# Patient Record
Sex: MALE | Race: WHITE | NOT HISPANIC OR LATINO
[De-identification: names, ages, dates, MRNs, and addresses within clinical notes are randomized per-mention and may not be internally consistent; named-entity substitution may affect disease eponyms.]

---

## 2017-07-26 PROBLEM — Z00.00 ENCOUNTER FOR PREVENTIVE HEALTH EXAMINATION: Status: ACTIVE | Noted: 2017-07-26

## 2017-08-02 ENCOUNTER — APPOINTMENT (OUTPATIENT)
Dept: CARDIOLOGY | Facility: CLINIC | Age: 32
End: 2017-08-02

## 2018-11-28 ENCOUNTER — INPATIENT (INPATIENT)
Facility: HOSPITAL | Age: 33
LOS: 6 days | Discharge: HOME | DRG: 418 | End: 2018-12-05
Attending: HOSPITALIST | Admitting: STUDENT IN AN ORGANIZED HEALTH CARE EDUCATION/TRAINING PROGRAM
Payer: COMMERCIAL

## 2018-11-28 VITALS
HEART RATE: 116 BPM | OXYGEN SATURATION: 99 % | TEMPERATURE: 99 F | SYSTOLIC BLOOD PRESSURE: 184 MMHG | DIASTOLIC BLOOD PRESSURE: 113 MMHG | RESPIRATION RATE: 22 BRPM

## 2018-11-28 LAB
ALBUMIN SERPL ELPH-MCNC: 4.5 G/DL — SIGNIFICANT CHANGE UP (ref 3.3–5.2)
ALP SERPL-CCNC: 56 U/L — SIGNIFICANT CHANGE UP (ref 40–120)
ALT FLD-CCNC: 104 U/L — HIGH
ANION GAP SERPL CALC-SCNC: 15 MMOL/L — SIGNIFICANT CHANGE UP (ref 5–17)
APTT BLD: 30 SEC — SIGNIFICANT CHANGE UP (ref 27.5–36.3)
AST SERPL-CCNC: 127 U/L — HIGH
BASOPHILS # BLD AUTO: 0 K/UL — SIGNIFICANT CHANGE UP (ref 0–0.2)
BASOPHILS NFR BLD AUTO: 0.1 % — SIGNIFICANT CHANGE UP (ref 0–2)
BILIRUB SERPL-MCNC: 0.8 MG/DL — SIGNIFICANT CHANGE UP (ref 0.4–2)
BUN SERPL-MCNC: 11 MG/DL — SIGNIFICANT CHANGE UP (ref 8–20)
CALCIUM SERPL-MCNC: 9.6 MG/DL — SIGNIFICANT CHANGE UP (ref 8.6–10.2)
CHLORIDE SERPL-SCNC: 99 MMOL/L — SIGNIFICANT CHANGE UP (ref 98–107)
CO2 SERPL-SCNC: 24 MMOL/L — SIGNIFICANT CHANGE UP (ref 22–29)
CREAT SERPL-MCNC: 0.65 MG/DL — SIGNIFICANT CHANGE UP (ref 0.5–1.3)
EOSINOPHIL # BLD AUTO: 0.2 K/UL — SIGNIFICANT CHANGE UP (ref 0–0.5)
EOSINOPHIL NFR BLD AUTO: 2 % — SIGNIFICANT CHANGE UP (ref 0–5)
GLUCOSE SERPL-MCNC: 125 MG/DL — HIGH (ref 70–115)
HCT VFR BLD CALC: 43 % — SIGNIFICANT CHANGE UP (ref 42–52)
HGB BLD-MCNC: 14.4 G/DL — SIGNIFICANT CHANGE UP (ref 14–18)
INR BLD: 1.06 RATIO — SIGNIFICANT CHANGE UP (ref 0.88–1.16)
LIDOCAIN IGE QN: 26 U/L — SIGNIFICANT CHANGE UP (ref 22–51)
LYMPHOCYTES # BLD AUTO: 3.6 K/UL — SIGNIFICANT CHANGE UP (ref 1–4.8)
LYMPHOCYTES # BLD AUTO: 48.8 % — SIGNIFICANT CHANGE UP (ref 20–55)
MCHC RBC-ENTMCNC: 27.7 PG — SIGNIFICANT CHANGE UP (ref 27–31)
MCHC RBC-ENTMCNC: 33.5 G/DL — SIGNIFICANT CHANGE UP (ref 32–36)
MCV RBC AUTO: 82.7 FL — SIGNIFICANT CHANGE UP (ref 80–94)
MONOCYTES # BLD AUTO: 0.6 K/UL — SIGNIFICANT CHANGE UP (ref 0–0.8)
MONOCYTES NFR BLD AUTO: 7.6 % — SIGNIFICANT CHANGE UP (ref 3–10)
NEUTROPHILS # BLD AUTO: 3 K/UL — SIGNIFICANT CHANGE UP (ref 1.8–8)
NEUTROPHILS NFR BLD AUTO: 41.4 % — SIGNIFICANT CHANGE UP (ref 37–73)
PLATELET # BLD AUTO: 281 K/UL — SIGNIFICANT CHANGE UP (ref 150–400)
POTASSIUM SERPL-MCNC: 4.1 MMOL/L — SIGNIFICANT CHANGE UP (ref 3.5–5.3)
POTASSIUM SERPL-SCNC: 4.1 MMOL/L — SIGNIFICANT CHANGE UP (ref 3.5–5.3)
PROT SERPL-MCNC: 7.8 G/DL — SIGNIFICANT CHANGE UP (ref 6.6–8.7)
PROTHROM AB SERPL-ACNC: 12.2 SEC — SIGNIFICANT CHANGE UP (ref 10–12.9)
RBC # BLD: 5.2 M/UL — SIGNIFICANT CHANGE UP (ref 4.6–6.2)
RBC # FLD: 13.3 % — SIGNIFICANT CHANGE UP (ref 11–15.6)
SODIUM SERPL-SCNC: 138 MMOL/L — SIGNIFICANT CHANGE UP (ref 135–145)
TROPONIN T SERPL-MCNC: <0.01 NG/ML — SIGNIFICANT CHANGE UP (ref 0–0.06)
WBC # BLD: 7.3 K/UL — SIGNIFICANT CHANGE UP (ref 4.8–10.8)
WBC # FLD AUTO: 7.3 K/UL — SIGNIFICANT CHANGE UP (ref 4.8–10.8)

## 2018-11-28 PROCEDURE — 71046 X-RAY EXAM CHEST 2 VIEWS: CPT | Mod: 26

## 2018-11-28 PROCEDURE — 99285 EMERGENCY DEPT VISIT HI MDM: CPT

## 2018-11-28 RX ORDER — FAMOTIDINE 10 MG/ML
20 INJECTION INTRAVENOUS ONCE
Qty: 0 | Refills: 0 | Status: COMPLETED | OUTPATIENT
Start: 2018-11-28 | End: 2018-11-28

## 2018-11-28 RX ORDER — HYDROMORPHONE HYDROCHLORIDE 2 MG/ML
1 INJECTION INTRAMUSCULAR; INTRAVENOUS; SUBCUTANEOUS ONCE
Qty: 0 | Refills: 0 | Status: DISCONTINUED | OUTPATIENT
Start: 2018-11-28 | End: 2018-11-28

## 2018-11-28 RX ORDER — SODIUM CHLORIDE 9 MG/ML
2000 INJECTION INTRAMUSCULAR; INTRAVENOUS; SUBCUTANEOUS ONCE
Qty: 0 | Refills: 0 | Status: COMPLETED | OUTPATIENT
Start: 2018-11-28 | End: 2018-11-28

## 2018-11-28 RX ORDER — ONDANSETRON 8 MG/1
4 TABLET, FILM COATED ORAL ONCE
Qty: 0 | Refills: 0 | Status: COMPLETED | OUTPATIENT
Start: 2018-11-28 | End: 2018-11-28

## 2018-11-28 RX ADMIN — ONDANSETRON 4 MILLIGRAM(S): 8 TABLET, FILM COATED ORAL at 19:08

## 2018-11-28 RX ADMIN — FAMOTIDINE 20 MILLIGRAM(S): 10 INJECTION INTRAVENOUS at 19:08

## 2018-11-28 RX ADMIN — SODIUM CHLORIDE 2000 MILLILITER(S): 9 INJECTION INTRAMUSCULAR; INTRAVENOUS; SUBCUTANEOUS at 19:47

## 2018-11-28 RX ADMIN — HYDROMORPHONE HYDROCHLORIDE 1 MILLIGRAM(S): 2 INJECTION INTRAMUSCULAR; INTRAVENOUS; SUBCUTANEOUS at 19:04

## 2018-11-28 RX ADMIN — HYDROMORPHONE HYDROCHLORIDE 1 MILLIGRAM(S): 2 INJECTION INTRAMUSCULAR; INTRAVENOUS; SUBCUTANEOUS at 19:09

## 2018-11-28 NOTE — ED ADULT TRIAGE NOTE - CHIEF COMPLAINT QUOTE
Pt with epigastric pain after eating [employer]. Pt is in aflutter on the monitor. Has h/o aflutter and non-compliant with medication.

## 2018-11-28 NOTE — ED ADULT NURSE NOTE - OBJECTIVE STATEMENT
pt biba at 1900. pt c/o abd pain and chest pain that started after eating t[employer]. pt is tearful. pt abd is tender to medial abd. positive bowel sounds in all four quadrants. pt is afib on cardiac monitor.

## 2018-11-28 NOTE — ED ADULT NURSE REASSESSMENT NOTE - NS ED NURSE REASSESS COMMENT FT1
Assumed pt care at 2130.  Pt awake, alert and oriented x3.  Rapid aflutter on cardiac monitor.  Pt denies chest pain at this time.  C/o 5/10 epigastric pain.  Abdomen soft, nontender, nondistended.
Pt returned from US without incident.   vital signs stable at this time.  Pt states he is feeling better.  comfortable appearance.  will continue to monitor.
assumed care of pt 2300, charting as noted. pt alert and oriented x4 in no current distress. MD called due to pt still having pain in left shoulder. when pt is resting HR 70, when patient talks HR increases to 130, MD aware.
Pt care assumed.  Pt reports improvement in conditions but continues to feel mild epigastric pain w radiation to back.  Will continue to monitor.

## 2018-11-28 NOTE — ED ADULT NURSE NOTE - NSIMPLEMENTINTERV_GEN_ALL_ED
Implemented All Universal Safety Interventions:  Williams to call system. Call bell, personal items and telephone within reach. Instruct patient to call for assistance. Room bathroom lighting operational. Non-slip footwear when patient is off stretcher. Physically safe environment: no spills, clutter or unnecessary equipment. Stretcher in lowest position, wheels locked, appropriate side rails in place.

## 2018-11-29 ENCOUNTER — TRANSCRIPTION ENCOUNTER (OUTPATIENT)
Age: 33
End: 2018-11-29

## 2018-11-29 DIAGNOSIS — I48.92 UNSPECIFIED ATRIAL FLUTTER: ICD-10-CM

## 2018-11-29 LAB
ALBUMIN SERPL ELPH-MCNC: 4 G/DL — SIGNIFICANT CHANGE UP (ref 3.3–5.2)
ALP SERPL-CCNC: 76 U/L — SIGNIFICANT CHANGE UP (ref 40–120)
ALT FLD-CCNC: 543 U/L — HIGH
AMPHET UR-MCNC: NEGATIVE — SIGNIFICANT CHANGE UP
ANION GAP SERPL CALC-SCNC: 14 MMOL/L — SIGNIFICANT CHANGE UP (ref 5–17)
APPEARANCE UR: CLEAR — SIGNIFICANT CHANGE UP
AST SERPL-CCNC: 597 U/L — HIGH
BACTERIA # UR AUTO: ABNORMAL
BARBITURATES UR SCN-MCNC: NEGATIVE — SIGNIFICANT CHANGE UP
BASOPHILS # BLD AUTO: 0 K/UL — SIGNIFICANT CHANGE UP (ref 0–0.2)
BASOPHILS NFR BLD AUTO: 0.3 % — SIGNIFICANT CHANGE UP (ref 0–2)
BENZODIAZ UR-MCNC: NEGATIVE — SIGNIFICANT CHANGE UP
BILIRUB DIRECT SERPL-MCNC: 2 MG/DL — HIGH (ref 0–0.3)
BILIRUB SERPL-MCNC: 4 MG/DL — HIGH (ref 0.4–2)
BILIRUB UR-MCNC: NEGATIVE — SIGNIFICANT CHANGE UP
BUN SERPL-MCNC: 10 MG/DL — SIGNIFICANT CHANGE UP (ref 8–20)
CALCIUM SERPL-MCNC: 9 MG/DL — SIGNIFICANT CHANGE UP (ref 8.6–10.2)
CHLORIDE SERPL-SCNC: 101 MMOL/L — SIGNIFICANT CHANGE UP (ref 98–107)
CHOLEST SERPL-MCNC: 109 MG/DL — LOW (ref 110–199)
CO2 SERPL-SCNC: 25 MMOL/L — SIGNIFICANT CHANGE UP (ref 22–29)
COCAINE METAB.OTHER UR-MCNC: NEGATIVE — SIGNIFICANT CHANGE UP
COLOR SPEC: YELLOW — SIGNIFICANT CHANGE UP
CREAT SERPL-MCNC: 0.68 MG/DL — SIGNIFICANT CHANGE UP (ref 0.5–1.3)
DIFF PNL FLD: ABNORMAL
EOSINOPHIL # BLD AUTO: 0 K/UL — SIGNIFICANT CHANGE UP (ref 0–0.5)
EOSINOPHIL NFR BLD AUTO: 1.3 % — SIGNIFICANT CHANGE UP (ref 0–5)
EPI CELLS # UR: SIGNIFICANT CHANGE UP
GLUCOSE SERPL-MCNC: 106 MG/DL — SIGNIFICANT CHANGE UP (ref 70–115)
GLUCOSE UR QL: NEGATIVE MG/DL — SIGNIFICANT CHANGE UP
HBA1C BLD-MCNC: 5 % — SIGNIFICANT CHANGE UP (ref 4–5.6)
HCT VFR BLD CALC: 43.1 % — SIGNIFICANT CHANGE UP (ref 42–52)
HDLC SERPL-MCNC: 37 MG/DL — LOW
HGB BLD-MCNC: 14.1 G/DL — SIGNIFICANT CHANGE UP (ref 14–18)
KETONES UR-MCNC: ABNORMAL
LEUKOCYTE ESTERASE UR-ACNC: ABNORMAL
LIPID PNL WITH DIRECT LDL SERPL: 57 MG/DL — SIGNIFICANT CHANGE UP
LYMPHOCYTES # BLD AUTO: 1.2 K/UL — SIGNIFICANT CHANGE UP (ref 1–4.8)
LYMPHOCYTES # BLD AUTO: 33.1 % — SIGNIFICANT CHANGE UP (ref 20–55)
MAGNESIUM SERPL-MCNC: 2 MG/DL — SIGNIFICANT CHANGE UP (ref 1.6–2.6)
MAGNESIUM SERPL-MCNC: 2 MG/DL — SIGNIFICANT CHANGE UP (ref 1.6–2.6)
MCHC RBC-ENTMCNC: 27.3 PG — SIGNIFICANT CHANGE UP (ref 27–31)
MCHC RBC-ENTMCNC: 32.7 G/DL — SIGNIFICANT CHANGE UP (ref 32–36)
MCV RBC AUTO: 83.5 FL — SIGNIFICANT CHANGE UP (ref 80–94)
METHADONE UR-MCNC: NEGATIVE — SIGNIFICANT CHANGE UP
MONOCYTES # BLD AUTO: 0.2 K/UL — SIGNIFICANT CHANGE UP (ref 0–0.8)
MONOCYTES NFR BLD AUTO: 6.6 % — SIGNIFICANT CHANGE UP (ref 3–10)
NEUTROPHILS # BLD AUTO: 2.2 K/UL — SIGNIFICANT CHANGE UP (ref 1.8–8)
NEUTROPHILS NFR BLD AUTO: 58.7 % — SIGNIFICANT CHANGE UP (ref 37–73)
NITRITE UR-MCNC: NEGATIVE — SIGNIFICANT CHANGE UP
OPIATES UR-MCNC: POSITIVE
PCP SPEC-MCNC: SIGNIFICANT CHANGE UP
PCP UR-MCNC: NEGATIVE — SIGNIFICANT CHANGE UP
PH UR: 6 — SIGNIFICANT CHANGE UP (ref 5–8)
PHOSPHATE SERPL-MCNC: 3.7 MG/DL — SIGNIFICANT CHANGE UP (ref 2.4–4.7)
PLATELET # BLD AUTO: 260 K/UL — SIGNIFICANT CHANGE UP (ref 150–400)
POTASSIUM SERPL-MCNC: 3.8 MMOL/L — SIGNIFICANT CHANGE UP (ref 3.5–5.3)
POTASSIUM SERPL-SCNC: 3.8 MMOL/L — SIGNIFICANT CHANGE UP (ref 3.5–5.3)
PROT SERPL-MCNC: 6.5 G/DL — LOW (ref 6.6–8.7)
PROT UR-MCNC: 15 MG/DL
RBC # BLD: 5.16 M/UL — SIGNIFICANT CHANGE UP (ref 4.6–6.2)
RBC # FLD: 13.3 % — SIGNIFICANT CHANGE UP (ref 11–15.6)
RBC CASTS # UR COMP ASSIST: SIGNIFICANT CHANGE UP /HPF (ref 0–4)
SODIUM SERPL-SCNC: 140 MMOL/L — SIGNIFICANT CHANGE UP (ref 135–145)
SP GR SPEC: 1.02 — SIGNIFICANT CHANGE UP (ref 1.01–1.02)
T3 SERPL-MCNC: 177 NG/DL — SIGNIFICANT CHANGE UP (ref 80–200)
T4 AB SER-ACNC: 11.1 UG/DL — SIGNIFICANT CHANGE UP (ref 4.5–12)
THC UR QL: NEGATIVE — SIGNIFICANT CHANGE UP
TOTAL CHOLESTEROL/HDL RATIO MEASUREMENT: 3 RATIO — LOW (ref 3.4–9.6)
TRIGL SERPL-MCNC: 77 MG/DL — SIGNIFICANT CHANGE UP (ref 10–200)
TROPONIN T SERPL-MCNC: <0.01 NG/ML — SIGNIFICANT CHANGE UP (ref 0–0.06)
TSH SERPL-MCNC: <0.1 UIU/ML — LOW (ref 0.27–4.2)
UROBILINOGEN FLD QL: 1 MG/DL
WBC # BLD: 3.8 K/UL — LOW (ref 4.8–10.8)
WBC # FLD AUTO: 3.8 K/UL — LOW (ref 4.8–10.8)
WBC UR QL: SIGNIFICANT CHANGE UP

## 2018-11-29 PROCEDURE — 99223 1ST HOSP IP/OBS HIGH 75: CPT | Mod: GC

## 2018-11-29 PROCEDURE — 12345: CPT | Mod: NC,GC

## 2018-11-29 PROCEDURE — 93010 ELECTROCARDIOGRAM REPORT: CPT

## 2018-11-29 RX ORDER — METOPROLOL TARTRATE 50 MG
50 TABLET ORAL DAILY
Qty: 0 | Refills: 0 | Status: DISCONTINUED | OUTPATIENT
Start: 2018-11-29 | End: 2018-11-30

## 2018-11-29 RX ORDER — SODIUM CHLORIDE 9 MG/ML
1000 INJECTION, SOLUTION INTRAVENOUS
Qty: 0 | Refills: 0 | Status: DISCONTINUED | OUTPATIENT
Start: 2018-11-29 | End: 2018-12-02

## 2018-11-29 RX ORDER — ASPIRIN/CALCIUM CARB/MAGNESIUM 324 MG
81 TABLET ORAL DAILY
Qty: 0 | Refills: 0 | Status: DISCONTINUED | OUTPATIENT
Start: 2018-11-29 | End: 2018-12-02

## 2018-11-29 RX ORDER — OXYCODONE HYDROCHLORIDE 5 MG/1
5 TABLET ORAL EVERY 8 HOURS
Qty: 0 | Refills: 0 | Status: DISCONTINUED | OUTPATIENT
Start: 2018-11-29 | End: 2018-12-04

## 2018-11-29 RX ADMIN — Medication 81 MILLIGRAM(S): at 17:59

## 2018-11-29 RX ADMIN — Medication 50 MILLIGRAM(S): at 05:06

## 2018-11-29 RX ADMIN — OXYCODONE HYDROCHLORIDE 5 MILLIGRAM(S): 5 TABLET ORAL at 06:51

## 2018-11-29 RX ADMIN — Medication 375 MILLIGRAM(S): at 08:44

## 2018-11-29 RX ADMIN — OXYCODONE HYDROCHLORIDE 5 MILLIGRAM(S): 5 TABLET ORAL at 07:21

## 2018-11-29 RX ADMIN — Medication 375 MILLIGRAM(S): at 09:10

## 2018-11-29 NOTE — H&P ADULT - ASSESSMENT
33 yom with PMH of Hyperthyroidism and Atrial Flutter, non-compliant with medication, presenting for acute onset chest pain. Found in ED to be in Aflutter vs Afib with RVR s/p 10mg Cardizem in ER with rate control established. Incidentally found to have cholelithiasis without cholecystitis on sono. Patient to be admitted for Afib.     Admit to medicine, Dr. Nicholas  Telemetry bed   Ambulate as tolerated  Vitals per routine  DASH TLC diet     Atrial Fibrillation with RVR vs Atrial Flutter  s/p cardizem 10mg IVP in ER with rate control established   Atrial flutter noted on EKG   CHADVASC score 1, for HTN, will continue on aspirin. Was never on anticoagulation  Not compliant with medication, stopped on his own  Will continue with his prior home medication, Metoprolol 50mg PO   Will call Cardio, previously seen by [facility]   urine tox ordered  tsh ordered     Hyperthyroidism  TSH level pending  previously was taking methimazole, unsure of dose   will await thyroid function before restarting  suspect as contributing component to arrythmia     Hyperglycemia  Noted on serum chemistry  Ordering A1c     Cholelithiasis without Cholecystitis  No acute intervention likely as currently asymptomatic   Sonographic Williamson negative   Surgery was consulted by ED, awaiting final recommendations    Transaminitis  will avoid hepatotoxic agents for now  Will trend LFTs    Preventive Measure   VCD boots 33 yom with PMH of Hyperthyroidism and Atrial Flutter, non-compliant with medication, presenting for acute onset chest pain. Found in ED to be in Aflutter vs Afib with RVR s/p 10mg Cardizem in ER with rate control established. Incidentally found to have cholelithiasis without cholecystitis on sono. Patient to be admitted for Afib.     Admit to medicine, Dr. Nicholas  Telemetry bed   Ambulate as tolerated  Vitals per routine  DASH TLC diet     Atrial Fibrillation with RVR vs Atrial Flutter  s/p cardizem 10mg IVP in ER with rate control established   Atrial flutter noted on EKG   CHADVASC score 1, for HTN, will continue on aspirin. Was never on anticoagulation  Not compliant with medication, stopped on his own  Will continue with his prior home medication, Metoprolol 50mg PO   Will call Cardio, previously seen by [facility]   urine tox ordered  tsh ordered   Will trend troponins, 1st trop negative     Hyperthyroidism  TSH level pending  previously was taking methimazole, unsure of dose   will await thyroid function before restarting  suspect as contributing component to arrythmia     Hyperglycemia  Noted on serum chemistry  Ordering A1c     Cholelithiasis without Cholecystitis  No acute intervention likely as currently asymptomatic   Sonographic Williamson negative   Surgery was consulted by ED, awaiting final recommendations    Transaminitis  will avoid hepatotoxic agents for now  Will trend LFTs    Preventive Measure   VCD boots 33 yom with PMH of Hyperthyroidism and Atrial Flutter, non-compliant with medication, presenting for acute onset chest pain. Found in ED to be in Aflutter vs Afib with RVR s/p 10mg Cardizem in ER with rate control established. Incidentally found to have cholelithiasis without cholecystitis on sono. Patient to be admitted for Afib.     Admit to medicine, Dr. Nicholas  Telemetry bed   Ambulate as tolerated  Vitals per routine  DASH TLC diet     Atrial Fibrillation with RVR vs Atrial Flutter  s/p cardizem 10mg IVP in ER with rate control established   Atrial flutter noted on EKG   CHADVASC score 1, for HTN, will continue on aspirin. Was never on anticoagulation  Not compliant with medication, stopped on his own  Will continue with his prior home medication, Metoprolol 50mg PO   Will call Cardio, previously seen by [facility]   urine tox ordered  tsh ordered   Will trend troponins, 1st trop negative   TTE ordered    Hyperthyroidism  TSH level pending  previously was taking methimazole, unsure of dose   will await thyroid function before restarting  suspect as contributing component to arrythmia     Hyperglycemia  Noted on serum chemistry  Ordering A1c     Cholelithiasis without Cholecystitis  No acute intervention likely as currently asymptomatic   Sonographic Williamson negative   Surgery was consulted by ED, awaiting final recommendations    Transaminitis  will avoid hepatotoxic agents for now  Will trend LFTs  Hepatitis panel pending    Preventive Measure   VCD boots 33 yom with PMH of Hyperthyroidism and Atrial Flutter, non-compliant with medication, presenting for acute onset chest pain. Found in ED to be in Aflutter vs Afib with RVR s/p 10mg Cardizem in ER with rate control established. Incidentally found to have cholelithiasis without cholecystitis on sono. Patient to be admitted for Afib.     Admit to medicine, Dr. Nicholas  Telemetry bed   Ambulate as tolerated  Vitals per routine  DASH TLC diet     Atrial Fibrillation with RVR vs Atrial Flutter  s/p cardizem 10mg IVP in ER with rate control established   Atrial flutter noted on EKG   CHADVASC score 1, for HTN, will continue on aspirin. Was never on anticoagulation  Not compliant with medication, stopped on his own  Will continue with his prior home medication, Metoprolol 50mg PO   Will call Cardio, previously seen by [facility]   urine tox ordered  tsh ordered   Will trend troponins, 1st trop negative   TTE ordered    Hyperthyroidism  TSH level pending  previously was taking methimazole, unsure of dose   will await thyroid function before restarting  suspect as contributing component to arrythmia     Hyperglycemia  Noted on serum chemistry  Ordering A1c     Cholelithiasis without Cholecystitis  No acute intervention likely as currently asymptomatic   Sonographic Williamson negative   Us noted for biliary sludge  Surgery consult appreciated, plan for cholecystectomy this admission     Transaminitis  will avoid hepatotoxic agents for now  Will trend LFTs  Hepatitis panel pending    Preventive Measure   VCD boots 33 yom with PMH of Hyperthyroidism and Atrial Flutter, non-compliant with medication, presenting for acute onset chest pain. Found in ED to be in Aflutter vs Afib with RVR s/p 10mg Cardizem in ER with rate control established. Incidentally found to have cholelithiasis without cholecystitis on sono. Patient to be admitted for Afib.     Admit to medicine, Dr. Nicholas  Telemetry bed   Ambulate as tolerated  Vitals q6hr  DASH TLC diet     Atrial Fibrillation with RVR vs Atrial Flutter  s/p cardizem 10mg IVP in ER with rate control established   Atrial flutter noted on EKG   CHADVASC score 0-1, (?hx HTN), will continue on aspirin. Was never on anticoagulation  Not compliant with medication, stopped on his own  Will continue with his prior home medication, Metoprolol 50mg PO   Will call Cardio, previously seen by [facility]   urine tox ordered  tsh ordered   Will trend troponins, 1st trop negative   TTE ordered    Hyperthyroidism  TSH level pending  previously was taking methimazole, unsure of dose   will await thyroid function before restarting  suspect as contributing component to arrythmia     Hyperglycemia  Noted on serum chemistry  Ordering A1c     Biliary Colic/Cholelithiasis without Cholecystitis  No acute intervention likely as currently asymptomatic   Sonographic Williamson negative   Us noted for biliary sludge  Surgery consult appreciated, tentative plan for cholecystectomy this admission     Transaminitis  will avoid hepatotoxic agents for now  Will trend LFTs  Hepatitis panel pending    Preventive Measure   VCD boots 33 yom with PMH of Hyperthyroidism and Atrial Flutter, non-compliant with medication, presenting for acute onset chest pain. Found in ED to be in Aflutter vs Afib with RVR s/p 10mg Cardizem in ER with rate control established. Incidentally found to have cholelithiasis without cholecystitis on sono. Patient to be admitted for Afib.     Admit to medicine, Dr. Nicholas  Telemetry bed   Ambulate as tolerated  Vitals q6hr  DASH TLC diet     Atrial Fibrillation/flutter with RVR  s/p cardizem 10mg IVP in ER with rate control established   Atrial flutter noted on EKG   CHADVASC score 0-1, (?hx HTN), will continue on aspirin. Was never on anticoagulation  Not compliant with medication, stopped on his own  Will continue with his prior home medication, Metoprolol 50mg PO   Will call Cardio, previously seen by [facility]   urine tox ordered  tsh ordered   Will trend troponins, 1st trop negative   TTE ordered    Hyperthyroidism  TSH level pending  previously was taking methimazole, unsure of dose   will await thyroid function before restarting  suspect as contributing component to arrythmia     Hyperglycemia  Noted on serum chemistry  Ordering A1c     Biliary Colic/Cholelithiasis without Cholecystitis  No acute intervention likely as currently asymptomatic   Sonographic Williamson negative   Us noted for biliary sludge  Surgery consult appreciated, tentative plan for cholecystectomy this admission     Transaminitis  will avoid hepatotoxic agents for now  Will trend LFTs  Hepatitis panel pending    Preventive Measure   VCD boots

## 2018-11-29 NOTE — H&P ADULT - HISTORY OF PRESENT ILLNESS
33 yom with PMH of Hyperthyroidism and Atrial Flutter, non-compliant with medication last taken over 1 year ago, no prior anticoagulant use, presents to ER complaining of sudden onset chest pain that began around 630pm. Says that he was just finished eating [employer] for dinner, then noted to experience a sharp 10/10 left sided chest pain that radiated to his right shoulder and back. Says that it was present constantly, exacerbated with movement. No alleviating factors noted. Associated with dizziness, palpitations, diaphoresis, nausea, vomit x1. Has never had symptoms like this before in past. States that he has also experienced an epigastric abdominal pain on and off for some time, not associated with food as he experiences it with variety of foods and at times with no food intake. Alleviated with massaging his abdomen and with warm showers. 33 yom with PMH of Hyperthyroidism and Atrial Flutter, non-compliant with medication last taken over 1 year ago, no prior anticoagulant use, presents to ER complaining of sudden onset chest pain that began around 630pm. Says that he was just finished eating [employer] for dinner, then noted to experience a sharp 10/10 left sided chest pain that radiated to his right shoulder and back. Says that it was present constantly, exacerbated with movement. No alleviating factors noted. Associated with dizziness, palpitations, diaphoresis, nausea, vomit x1. Has never had symptoms like this before in past. States that he has also experienced an intermittent epigastric abdominal pain with occasional radiation to shoulder and with aggravation with food.  Alleviated with massaging his abdomen and with warm showers.  In ED,

## 2018-11-29 NOTE — CONSULT NOTE ADULT - SUBJECTIVE AND OBJECTIVE BOX
GENERAL SURGERY CONSULT    Consulting surgical team: [facility] - Acute Care Surgery   Consulting Attending: Prashant  Patient seen and examined: 18 @ 02:05    HPI:  Patient is a 33y Male admitted to medicine service for atrial flutter. Per patient he reported sharp bilateral chest pain and sera epigastric pain following meal of [employer]. Pain radiated to right shoulder and back. Sx associated with nausea and one episode of vomiting. Per patient has had multiple episodes of right upper quadrant and epigastric pain after eating over the past year. Pain is often associated with vomiting. Pt states he has visited [facility] multiple times in past year for symptoms. He has never followed up a surgeon. Denies shortness of breath, burning epigastric pain, urinary symptoms.         PAST MEDICAL HISTORY:  HTN (hypertension)  Hypothyroid  Atrial flutter      PAST SURGICAL HISTORY:  No significant past surgical history      ALLERGIES:  No Known Allergies      MEDICATIONS:  aspirin  chewable 81 milliGRAM(s) Oral daily  metoprolol succinate ER 50 milliGRAM(s) Oral daily  naproxen 375 milliGRAM(s) Oral every 12 hours PRN  oxyCODONE    IR 5 milliGRAM(s) Oral every 8 hours PRN      VITALS & I/Os:  Vital Signs Last 24 Hrs  T(C): 37.2 (2018 19:04), Max: 37.2 (2018 19:04)  T(F): 99 (2018 19:04), Max: 99 (2018 19:04)  HR: 100 (2018 23:16) (76 - 134)  BP: 140/76 (2018 22:45) (140/76 - 184/113)  BP(mean): --  RR: 16 (2018 22:45) (16 - 22)  SpO2: 99% (2018 22:45) (95% - 99%)    I&O's Summary      PHYSICAL EXAM:  General: No acute distress  Respiratory: Nonlabored  Cardiovascular: S1/S2  Abdominal: Soft, mild epigastric tenderness. No guarding or rebound.   Extremities: Warm  Vascular: Radial Pulse 2+, bilaterally    LABS:                        14.4   7.3   )-----------( 281      ( 2018 19:32 )             43.0     11-28    138  |  99  |  11.0  ----------------------------<  125<H>  4.1   |  24.0  |  0.65    Ca    9.6      2018 19:32  Mg     2.0         TPro  7.8  /  Alb  4.5  /  TBili  0.8  /  DBili  x   /  AST  127<H>  /  ALT  104<H>  /  AlkPhos  56      Lactate:    PT/INR - ( 2018 19:32 )   PT: 12.2 sec;   INR: 1.06 ratio         PTT - ( 2018 19:32 )  PTT:30.0 sec    CARDIAC MARKERS ( 2018 19:32 )  x     / <0.01 ng/mL / x     / x     / x            Urinalysis Basic - ( 2018 00:58 )    Color: Yellow / Appearance: Clear / S.025 / pH: x  Gluc: x / Ketone: Trace  / Bili: Negative / Urobili: 1 mg/dL   Blood: x / Protein: 15 mg/dL / Nitrite: Negative   Leuk Esterase: Trace / RBC: 0-2 /HPF / WBC 0-2   Sq Epi: x / Non Sq Epi: Occasional / Bacteria: Occasional          IMAGING:   EXAM:  US ABDOMEN LIMITED                          PROCEDURE DATE:  2018          INTERPRETATION:  CLINICAL INFORMATION: Epigastric pain.    COMPARISON: None available.    TECHNIQUE: Sonography of the right upper quadrant.     FINDINGS:    Liver: 21 cm, enlarged.  Normal echogenicity.    Bile ducts: The common hepatic duct measures 9 mm in the proximal common   bile duct measures 6.6 mm.    Gallbladder: Sludge and multiple gallstones.  No gallbladder wall   thickening.  Sonographic Williamson sign was negative.        Pancreas: Obscured by bowel gas and not diagnostically assessed.    Right kidney: 10.9 cm No hydronephrosis.        Ascites: Trace ascites around the liver and gallbladder.    IVC: Visualized portions are within normal limits.    IMPRESSION:     1.  Cholelithiasis without sonographic evidence of acute cholecystitis.  2.  Mildly dilated extra hepatic bile duct.  The distal CBD is not   visualized and choledocholithiasis cannot be excluded on this   examination.  Correlate with liver function tests.  3.  Trace ascites around the liver and gallbladder.  4.  Hepatomegaly.  5.  The pancreas is obscured by bowel gas and not diagnostically assessed.        ASSESSMENT:  33y Male admitted to medicine service for atrial flutter. Pt also complains of multiple episodes of epigastric pain following meals  -US RQ indicates sludge, elevated CBD, and cholelithiasis  -Elevated AST and ALT. Normal T bili    PLAN:  -Agree with admission to medicine for atrial flutter  -Given sx of bilary colic over past year, patient may benefit from a cholecystectomy to alleviate abdominal symptoms  -Recommend medical and cardiac optimization; will plan for cholecystectomy on this admission  -Surgery to follow    Discussed with Dr. Cerda GENERAL SURGERY CONSULT    Consulting surgical team: [facility] - Acute Care Surgery   Consulting Attending: Prashant  Patient seen and examined: 18 @ 02:05    HPI:  Patient is a 33y Male admitted to medicine service for atrial flutter. Per patient he reported sharp bilateral chest pain and sera epigastric pain following meal of [employer]. Pain radiated to right shoulder and back. Sx associated with nausea and one episode of vomiting. Per patient has had multiple episodes of right upper quadrant and epigastric pain after eating over the past year. Pain is often associated with vomiting. Pt states he has visited [facility] multiple times in past year for symptoms. He has never followed up with a surgeon. Denies shortness of breath, burning epigastric pain, urinary symptoms.         PAST MEDICAL HISTORY:  HTN (hypertension)  Hypothyroid  Atrial flutter      PAST SURGICAL HISTORY:  No significant past surgical history      ALLERGIES:  No Known Allergies      MEDICATIONS:  aspirin  chewable 81 milliGRAM(s) Oral daily  metoprolol succinate ER 50 milliGRAM(s) Oral daily  naproxen 375 milliGRAM(s) Oral every 12 hours PRN  oxyCODONE    IR 5 milliGRAM(s) Oral every 8 hours PRN      VITALS & I/Os:  Vital Signs Last 24 Hrs  T(C): 37.2 (2018 19:04), Max: 37.2 (2018 19:04)  T(F): 99 (2018 19:04), Max: 99 (2018 19:04)  HR: 100 (2018 23:16) (76 - 134)  BP: 140/76 (2018 22:45) (140/76 - 184/113)  BP(mean): --  RR: 16 (2018 22:45) (16 - 22)  SpO2: 99% (2018 22:45) (95% - 99%)    I&O's Summary      PHYSICAL EXAM:  General: No acute distress  Respiratory: Nonlabored  Cardiovascular: S1/S2  Abdominal: Soft, mild epigastric tenderness. No guarding or rebound.   Extremities: Warm  Vascular: Radial Pulse 2+, bilaterally    LABS:                        14.4   7.3   )-----------( 281      ( 2018 19:32 )             43.0     11-28    138  |  99  |  11.0  ----------------------------<  125<H>  4.1   |  24.0  |  0.65    Ca    9.6      2018 19:32  Mg     2.0         TPro  7.8  /  Alb  4.5  /  TBili  0.8  /  DBili  x   /  AST  127<H>  /  ALT  104<H>  /  AlkPhos  56      Lactate:    PT/INR - ( 2018 19:32 )   PT: 12.2 sec;   INR: 1.06 ratio         PTT - ( 2018 19:32 )  PTT:30.0 sec    CARDIAC MARKERS ( 2018 19:32 )  x     / <0.01 ng/mL / x     / x     / x            Urinalysis Basic - ( 2018 00:58 )    Color: Yellow / Appearance: Clear / S.025 / pH: x  Gluc: x / Ketone: Trace  / Bili: Negative / Urobili: 1 mg/dL   Blood: x / Protein: 15 mg/dL / Nitrite: Negative   Leuk Esterase: Trace / RBC: 0-2 /HPF / WBC 0-2   Sq Epi: x / Non Sq Epi: Occasional / Bacteria: Occasional          IMAGING:   EXAM:  US ABDOMEN LIMITED                          PROCEDURE DATE:  2018          INTERPRETATION:  CLINICAL INFORMATION: Epigastric pain.    COMPARISON: None available.    TECHNIQUE: Sonography of the right upper quadrant.     FINDINGS:    Liver: 21 cm, enlarged.  Normal echogenicity.    Bile ducts: The common hepatic duct measures 9 mm in the proximal common   bile duct measures 6.6 mm.    Gallbladder: Sludge and multiple gallstones.  No gallbladder wall   thickening.  Sonographic Williamson sign was negative.        Pancreas: Obscured by bowel gas and not diagnostically assessed.    Right kidney: 10.9 cm No hydronephrosis.        Ascites: Trace ascites around the liver and gallbladder.    IVC: Visualized portions are within normal limits.    IMPRESSION:     1.  Cholelithiasis without sonographic evidence of acute cholecystitis.  2.  Mildly dilated extra hepatic bile duct.  The distal CBD is not   visualized and choledocholithiasis cannot be excluded on this   examination.  Correlate with liver function tests.  3.  Trace ascites around the liver and gallbladder.  4.  Hepatomegaly.  5.  The pancreas is obscured by bowel gas and not diagnostically assessed.        ASSESSMENT:  33y Male admitted to medicine service for atrial flutter. Pt also complains of multiple episodes of epigastric pain following meals  -US RQ indicates sludge, elevated CBD, and cholelithiasis  -Elevated AST and ALT. Normal T bili    PLAN:  -Agree with admission to medicine for atrial flutter  -Given sx of bilary colic over past year, patient may benefit from a cholecystectomy to alleviate abdominal symptoms  -Recommend medical and cardiac optimization; will plan for cholecystectomy on this admission  -Surgery to follow    Discussed with Dr. Cerda

## 2018-11-29 NOTE — ED PROVIDER NOTE - PROGRESS NOTE DETAILS
Received sign out from Dr. Brennan.  Pt's rate intermittently controlled, but remains in A-flutter.  US results as noted.  Surgery consult called for evaluation of GB.  Case d/w Hospitlalist/Dr. Nicholas and will admit

## 2018-11-29 NOTE — H&P ADULT - NSHPSOCIALHISTORY_GEN_ALL_CORE
Social drinker, states that he recently drank 6-7 glasses of wine recently over thanksgiving weekend  occasional marijuana smoker, last used over weekend  No tobacco use     Works as door inspecture  Currently homeless living in car

## 2018-11-29 NOTE — PROGRESS NOTE ADULT - SUBJECTIVE AND OBJECTIVE BOX
CC:    Dr Ruddy Navarro called  states pt cleared from cardiology standpoint for proposed surgical intervention.

## 2018-11-29 NOTE — CHART NOTE - NSCHARTNOTEFT_GEN_A_CORE
patient seen and evaluated at bedside.   denies n/v. has mild epigastric/ mid abd pain. denies palpitations.   no fever or chills.     Vital Signs Last 24 Hrs  T(C): 36.8 (29 Nov 2018 10:29), Max: 37.2 (28 Nov 2018 19:04)  T(F): 98.3 (29 Nov 2018 10:29), Max: 99 (28 Nov 2018 19:04)  HR: 83 (29 Nov 2018 10:29) (74 - 134)  BP: 148/91 (29 Nov 2018 10:29) (128/66 - 184/113)  BP(mean): --  RR: 18 (29 Nov 2018 10:29) (16 - 22)  SpO2: 98% (29 Nov 2018 10:29) (95% - 99%)    Physical Exam:   	Gen: adult male, resting comfortably in stretcher, NAD  	Head: NCAT  	Eyes: EOMI, PERRLA, Pupils 4mm to 3mm  	Mouth: moist oral mucosa  	CVS: +S1/S2, regular rate and rhythm, no murmurs, rubs or gallops appreciated. Tenderness to palpation to left chest wall, no cyanosis, edema or calf tenderness. 2+ radial and dorsalis pedal pulses  	Pulm: CTAB, no wheeze, rales, rhonchi  	GI: +BS, soft, non distended, umbilical tenderness with guarding, no rebound. No palpable flank tenderness or mass.  	Neuro: AAOx3, strength and light touch sensation grossly intact   	Skin: warm and dry  Psych: calm, cooperative      meds / labs reviewed.       >Atrial flutter with RVR  s/p cardizem 10mg IVP in ER with rate control established   Atrial flutter noted on EKG   CHADVASC score 0-1, (?hx HTN), will continue on aspirin. Was never on anticoagulation  Not compliant with medication, stopped on his own  Will continue with his prior home medication, Metoprolol 50mg PO   Seen by Cardiology dr. citlalli Navarro / cleared from cardiology standpoint for surgery   Will trend troponin, 1st trop negative   monitor and replete electrolytes   TTE done , results pending     >Biliary Colic/Cholelithiasis without Cholecystitis  No acute intervention likely as currently asymptomatic   Sonographic Williamson negative   Us noted for biliary sludge  Surgery consult appreciated, tentative plan for cholecystectomy this admission     >Transaminitis  will avoid hepatotoxic agents for now  Will trend LFTs  Hepatitis panel pending    >Hyperthyroidism  TSH level pending  previously was taking methimazole, unsure of dose   will await thyroid function before restarting  suspect as contributing component to arrythmia     >Hyperglycemia  Noted on serum chemistry  Ordering A1c       patient medically optimized for surgery.

## 2018-11-29 NOTE — H&P ADULT - NSHPREVIEWOFSYSTEMS_GEN_ALL_CORE
No fevers, chills, headache, orthopnea, paroxysmal nocturnal dyspnea, leg swelling, diarrhea, constipation, dysuria, hematuria.

## 2018-11-29 NOTE — ED PROVIDER NOTE - OBJECTIVE STATEMENT
34 y/o male comes in c/o chest pain , pt referred to us as possible stemi   pt in moderate distress ,upon  eval pt states was sitting in car after eating [employer]   similar episodes in past with epigastric tenderness   likely biliary colic   pt also in rapid a flutter h/o of a flutter noncompliant with meds

## 2018-11-29 NOTE — CONSULT NOTE ADULT - ASSESSMENT
33M with abdominal pain, AF with increased VR. Hx of AF, non-compliance with medication in the past.  Agree with present therapy, rate control, GI evaluation in progress.  Echocardiogram, telemetry.

## 2018-11-29 NOTE — H&P ADULT - NSHPLABSRESULTS_GEN_ALL_CORE
11-28    138  |  99  |  11.0  ----------------------------<  125<H>  4.1   |  24.0  |  0.65    Ca    9.6      28 Nov 2018 19:32  Mg     2.0     11-29    TPro  7.8  /  Alb  4.5  /  TBili  0.8  /  DBili  x   /  AST  127<H>  /  ALT  104<H>  /  AlkPhos  56  11-28                          14.4   7.3   )-----------( 281      ( 28 Nov 2018 19:32 )             43.0       Urine Microscopic-Add On (NC) (11.29.18 @ 00:58)    Red Blood Cell - Urine: 0-2 /HPF    White Blood Cell - Urine: 0-2    Bacteria: Occasional    Epithelial Cells: Occasional    Urinalysis (11.29.18 @ 00:58)    pH Urine: 6.0    Glucose Qualitative, Urine: Negative mg/dL    Blood, Urine: Trace    Color: Yellow    Urine Appearance: Clear    Bilirubin: Negative    Ketone - Urine: Trace    Specific Gravity: 1.025    Protein, Urine: 15 mg/dL    Urobilinogen: 1 mg/dL    Nitrite: Negative    Leukocyte Esterase Concentration: Trace      < from: US Abdomen Limited (11.28.18 @ 22:43) >    IMPRESSION:     1.  Cholelithiasis without sonographic evidence of acute cholecystitis.  2.  Mildly dilated extra hepatic bile duct.  The distal CBD is not   visualized and choledocholithiasis cannot be excluded on this   examination.  Correlate with liver function tests.  3.  Trace ascites around the liver and gallbladder.  4.  Hepatomegaly.  5.  The pancreas is obscured by bowel gas and not diagnostically assessed.      < end of copied text >    < from: US Abdomen Limited (11.28.18 @ 22:43) >    IMPRESSION:     1.  Cholelithiasis without sonographic evidence of acute cholecystitis.  2.  Mildly dilated extra hepatic bile duct.  The distal CBD is not   visualized and choledocholithiasis cannot be excluded on this   examination.  Correlate with liver function tests.  3.  Trace ascites around the liver and gallbladder.  4.  Hepatomegaly.  5.  The pancreas is obscured by bowel gas and not diagnostically assessed.      < end of copied text > 11-28    138  |  99  |  11.0  ----------------------------<  125<H>  4.1   |  24.0  |  0.65    Ca    9.6      28 Nov 2018 19:32  Mg     2.0     11-29    TPro  7.8  /  Alb  4.5  /  TBili  0.8  /  DBili  x   /  AST  127<H>  /  ALT  104<H>  /  AlkPhos  56  11-28                          14.4   7.3   )-----------( 281      ( 28 Nov 2018 19:32 )             43.0       Urine Microscopic-Add On (NC) (11.29.18 @ 00:58)    Red Blood Cell - Urine: 0-2 /HPF    White Blood Cell - Urine: 0-2    Bacteria: Occasional    Epithelial Cells: Occasional    Urinalysis (11.29.18 @ 00:58)    pH Urine: 6.0    Glucose Qualitative, Urine: Negative mg/dL    Blood, Urine: Trace    Color: Yellow    Urine Appearance: Clear    Bilirubin: Negative    Ketone - Urine: Trace    Specific Gravity: 1.025    Protein, Urine: 15 mg/dL    Urobilinogen: 1 mg/dL    Nitrite: Negative    Leukocyte Esterase Concentration: Trace        < from: US Abdomen Limited (11.28.18 @ 22:43) >    IMPRESSION:     1.  Cholelithiasis without sonographic evidence of acute cholecystitis.  2.  Mildly dilated extra hepatic bile duct.  The distal CBD is not   visualized and choledocholithiasis cannot be excluded on this   examination.  Correlate with liver function tests.  3.  Trace ascites around the liver and gallbladder.  4.  Hepatomegaly.  5.  The pancreas is obscured by bowel gas and not diagnostically assessed.      < end of copied text > 11-28    138  |  99  |  11.0  ----------------------------<  125<H>  4.1   |  24.0  |  0.65    Ca    9.6      28 Nov 2018 19:32  Mg     2.0     11-29    TPro  7.8  /  Alb  4.5  /  TBili  0.8  /  DBili  x   /  AST  127<H>  /  ALT  104<H>  /  AlkPhos  56  11-28                          14.4   7.3   )-----------( 281      ( 28 Nov 2018 19:32 )             43.0     EKG: Aflutter, HR 96,  QTc 482    Urine Microscopic-Add On (NC) (11.29.18 @ 00:58)    Red Blood Cell - Urine: 0-2 /HPF    White Blood Cell - Urine: 0-2    Bacteria: Occasional    Epithelial Cells: Occasional    Urinalysis (11.29.18 @ 00:58)    pH Urine: 6.0    Glucose Qualitative, Urine: Negative mg/dL    Blood, Urine: Trace    Color: Yellow    Urine Appearance: Clear    Bilirubin: Negative    Ketone - Urine: Trace    Specific Gravity: 1.025    Protein, Urine: 15 mg/dL    Urobilinogen: 1 mg/dL    Nitrite: Negative    Leukocyte Esterase Concentration: Trace        < from: US Abdomen Limited (11.28.18 @ 22:43) >    IMPRESSION:     1.  Cholelithiasis without sonographic evidence of acute cholecystitis.  2.  Mildly dilated extra hepatic bile duct.  The distal CBD is not   visualized and choledocholithiasis cannot be excluded on this   examination.  Correlate with liver function tests.  3.  Trace ascites around the liver and gallbladder.  4.  Hepatomegaly.  5.  The pancreas is obscured by bowel gas and not diagnostically assessed.      < end of copied text >

## 2018-11-29 NOTE — H&P ADULT - NSHPPHYSICALEXAM_GEN_ALL_CORE
VS:   Vital Signs Last 24 Hrs  T(C): 37.2 (28 Nov 2018 19:04), Max: 37.2 (28 Nov 2018 19:04)  T(F): 99 (28 Nov 2018 19:04), Max: 99 (28 Nov 2018 19:04)  HR: 100 (28 Nov 2018 23:16) (76 - 134)  BP: 140/76 (28 Nov 2018 22:45) (140/76 - 184/113)  RR: 16 (28 Nov 2018 22:45) (16 - 22)  SpO2: 99% (28 Nov 2018 22:45) (95% - 99%)    Physical Exam:   Gen: adult male, resting comfortably in stretcher, NAD  HEENT: NCAT, EOMI, PERRLA, Pupils 4mm to 3mm, moist oral mucosa  CVS: +S1/S2, irregular rate and rhythm, no murmurs, rubs or gallops appreciated  Pulm: CTAB, no wheeze, rales, rhonchi  GI: +BS, soft, non distended, umbilical tenderness with guarding, no rebound. No palpable flank tenderness or mass.  Ext: No cyanosis, edema or calf tenderness. 2+ radial and dorsalis pedal pulses  Neuro: AAOx3, no focal deficits. VS:   Vital Signs Last 24 Hrs  T(C): 37.2 (28 Nov 2018 19:04), Max: 37.2 (28 Nov 2018 19:04)  T(F): 99 (28 Nov 2018 19:04), Max: 99 (28 Nov 2018 19:04)  HR: 100 (28 Nov 2018 23:16) (76 - 134)  BP: 140/76 (28 Nov 2018 22:45) (140/76 - 184/113)  RR: 16 (28 Nov 2018 22:45) (16 - 22)  SpO2: 99% (28 Nov 2018 22:45) (95% - 99%)    Physical Exam:   Gen: adult male, resting comfortably in stretcher, NAD  Head: NCAT  Eyes: EOMI, PERRLA, Pupils 4mm to 3mm  Mouth: moist oral mucosa  CVS: +S1/S2, irregular rate and rhythm, no murmurs, rubs or gallops appreciated. Tenderness to palpation to left chest wall  Pulm: CTAB, no wheeze, rales, rhonchi  GI: +BS, soft, non distended, umbilical tenderness with guarding, no rebound. No palpable flank tenderness or mass.  Ext: No cyanosis, edema or calf tenderness. 2+ radial and dorsalis pedal pulses  Neuro: AAOx3, no focal deficits.  Skin: warm and dry VS:   Vital Signs Last 24 Hrs  T(C): 37.2 (28 Nov 2018 19:04), Max: 37.2 (28 Nov 2018 19:04)  T(F): 99 (28 Nov 2018 19:04), Max: 99 (28 Nov 2018 19:04)  HR: 100 (28 Nov 2018 23:16) (76 - 134)  BP: 140/76 (28 Nov 2018 22:45) (140/76 - 184/113)  RR: 16 (28 Nov 2018 22:45) (16 - 22)  SpO2: 99% (28 Nov 2018 22:45) (95% - 99%)    Physical Exam:   Gen: adult male, resting comfortably in stretcher, NAD  Head: NCAT  Eyes: EOMI, PERRLA, Pupils 4mm to 3mm  Mouth: moist oral mucosa  CVS: +S1/S2, regular rate and rhythm, no murmurs, rubs or gallops appreciated. Tenderness to palpation to left chest wall, no cyanosis, edema or calf tenderness. 2+ radial and dorsalis pedal pulses  Pulm: CTAB, no wheeze, rales, rhonchi  GI: +BS, soft, non distended, umbilical tenderness with guarding, no rebound. No palpable flank tenderness or mass.  Neuro: AAOx3, strength and light touch sensation grossly intact   Skin: warm and dry  Psych: calm, cooperative

## 2018-11-29 NOTE — CONSULT NOTE ADULT - ATTENDING COMMENTS
Will discussed cholecystectomy after medical optimization of his a flutter Will discussed cholecystectomy after medical optimization of his atrial flutter.  Consider also UGI to rule gastritis.  low fat diet

## 2018-11-29 NOTE — CONSULT NOTE ADULT - SUBJECTIVE AND OBJECTIVE BOX
Palpitation    33M with palpitation, brief CP vs abdominal discomfort.  Now resolved  PREVIOUS DIAGNOSTIC TESTING:      MEDICATIONS  (STANDING):  aspirin  chewable 81 milliGRAM(s) Oral daily  metoprolol succinate ER 50 milliGRAM(s) Oral daily    MEDICATIONS  (PRN):  naproxen 375 milliGRAM(s) Oral every 12 hours PRN Mild Pain (1 - 3)  oxyCODONE    IR 5 milliGRAM(s) Oral every 8 hours PRN Severe Pain (7 - 10)    FAMILY HISTORY:  Family history of cerebrovascular accident (CVA) (Mother)  Family history of coronary artery disease (Father)      REVIEW OF SYSTEMS:  CONSTITUTIONAL: No fever, weight loss, or fatigue  RESPIRATORY: No cough, wheezing, chills or hemoptysis; No Shortness of Breath  CARDIOVASCULAR: No chest pain, passing out, dizziness, or leg swelling  GENITOURINARY: No dysuria, frequency, hematuria, or incontinence  NEUROLOGICAL: No headaches, memory loss, loss of strength, numbness, or tremors  SKIN: No itching, burning, rashes, or lesions   LYMPH Nodes: No enlarged glands  ENDOCRINE: No heat or cold intolerance; No hair loss  MUSCULOSKELETAL: No joint pain or swelling; No muscle, back, or extremity pain  HEME/LYMPH: No easy bruising, or bleeding gums  ALLERY AND IMMUNOLOGIC: No hives or eczema	    Vital Signs Last 24 Hrs  T(C): 36.8 (2018 04:34), Max: 37.2 (2018 19:04)  T(F): 98.2 (2018 04:34), Max: 99 (2018 19:04)  HR: 76 (2018 04:34) (74 - 134)  BP: 130/88 (2018 04:34) (128/66 - 184/113)  BP(mean): --  RR: 16 (2018 04:34) (16 - 22)  SpO2: 98% (2018 04:34) (95% - 99%      PHYSICAL EXAM:  Appearance: Normal, well nourished		  Cardiovascular: Normal S1 S2, No JVD, No cardiac murmurs, No carotid bruits, No peripheral edema  Respiratory: Lungs clear to auscultation	  Gastrointestinal:  Soft, Non-tender, + BS, no bruits	  Skin: No rashes, No ecchymoses, No cyanosis  Neurologic: Grossly non-focal with full strength in all four extremities  Extremities: Normal range of motion, No clubbing, cyanosis or edema  Vascular: Peripheral pulses palpable 2+ bilaterally    ECG: A flutter, no acute st/t abn    LABS:                        14.4   7.3   )-----------( 281      ( 2018 19:32 )             43.0         138  |  99  |  11.0  ----------------------------<  125<H>  4.1   |  24.0  |  0.65    Ca    9.6      2018 19:32  Mg     2.0         TPro  7.8  /  Alb  4.5  /  TBili  0.8  /  DBili  x   /  AST  127<H>  /  ALT  104<H>  /  AlkPhos  56      CARDIAC MARKERS ( 2018 07:39 )  x     / <0.01 ng/mL / x     / x     / x      CARDIAC MARKERS ( 2018 19:32 )  x     / <0.01 ng/mL / x     / x     / x          PT/INR - ( 2018 19:32 )   PT: 12.2 sec;   INR: 1.06 ratio         PTT - ( 2018 19:32 )  PTT:30.0 sec  Urinalysis Basic - ( 2018 00:58 )    Color: Yellow / Appearance: Clear / S.025 / pH: x  Gluc: x / Ketone: Trace  / Bili: Negative / Urobili: 1 mg/dL   Blood: x / Protein: 15 mg/dL / Nitrite: Negative   Leuk Esterase: Trace / RBC: 0-2 /HPF / WBC 0-2   Sq Epi: x / Non Sq Epi: Occasional / Bacteria: Occasional

## 2018-11-29 NOTE — H&P ADULT - NSHPOUTPATIENTPROVIDERS_GEN_ALL_CORE
PMD: Leigh  [facility]D: Dr. Abraham  Cardio: [facility]

## 2018-11-29 NOTE — H&P ADULT - ATTENDING COMMENTS
Case seen and examined.  H&P reviewed and edited where appropriate.    Briefly, 33y/oM hx hyperthyroidism, Afib/flutter, non-compliant with meds, presenting with chest pain, palpitations and year long hx of intermittent epigastric abdominal pain associated with nausea, vomiting, worse with foods and with occasional radiation of pain of shoulder.  Pt reports non-compliance due to concern that meds (metoprolol , methimazole) was causing abdominal pain and also due to some 'insurance/coverage issues'. On admission found to be in Afib wit RVR, responded to cardizem 10mg IV.  On abd US found to have cholelithiasis without cholecystitis.  Plan to admit to telemetry, trend cardiac enzymes, obtain cardiology evaluation.  Resumed metoprolol for rate control.  CHADsVASc 0-1 (?hx HTN), started ASA, pending HgbA1c to assess for DM which would alter Afib risk stratification.   Surgery consulted and may plan to do elective cholecystectomy for biliary colic. Ordered thyroid function tests, pending results, may need endocrine evaluation if results show acute hyperthyroidism. Case seen and examined.  H&P reviewed and edited where appropriate.    Briefly, 33y/oM hx hyperthyroidism, Afib/flutter, non-compliant with meds, presenting with chest pain, palpitations and year long hx of intermittent epigastric abdominal pain associated with nausea, vomiting, worse with foods and with occasional radiation of pain of shoulder.  Pt reports non-compliance due to concern that meds (metoprolol ER, methimazole) was causing abdominal pain and also due to some 'insurance/coverage issues'. On admission found to be in Afib wit RVR, responded to cardizem 10mg IV.  On abd US found to have cholelithiasis without cholecystitis.  Plan to admit to telemetry, trend cardiac enzymes, obtain cardiology evaluation.  Resumed metoprolol ER for rate control.  CHADsVASc 0-1 (?hx HTN), started ASA, pending HgbA1c to assess for DM which would alter Afib risk stratification.   Surgery consulted and may plan to do elective cholecystectomy for biliary colic. Holding off on resuming methimazole as thyroid status unclear.  Ordered thyroid function tests, pending results, may need endocrine evaluation if results show acute hyperthyroidism.

## 2018-11-29 NOTE — H&P ADULT - FAMILY HISTORY
No pertinent family history in first degree relatives Father  Still living? Unknown  Family history of coronary artery disease, Age at diagnosis: Age Unknown     Mother  Still living? Unknown  Family history of cerebrovascular accident (CVA), Age at diagnosis: Age Unknown

## 2018-11-30 LAB
ABO RH CONFIRMATION: SIGNIFICANT CHANGE UP
ALBUMIN SERPL ELPH-MCNC: 4 G/DL — SIGNIFICANT CHANGE UP (ref 3.3–5.2)
ALP SERPL-CCNC: 114 U/L — SIGNIFICANT CHANGE UP (ref 40–120)
ALT FLD-CCNC: 454 U/L — HIGH
ANION GAP SERPL CALC-SCNC: 11 MMOL/L — SIGNIFICANT CHANGE UP (ref 5–17)
AST SERPL-CCNC: 212 U/L — HIGH
BILIRUB DIRECT SERPL-MCNC: 4.7 MG/DL — HIGH (ref 0–0.3)
BILIRUB INDIRECT FLD-MCNC: 2.5 MG/DL — HIGH (ref 0.2–1)
BILIRUB SERPL-MCNC: 7.2 MG/DL — HIGH (ref 0.4–2)
BUN SERPL-MCNC: 8 MG/DL — SIGNIFICANT CHANGE UP (ref 8–20)
CALCIUM SERPL-MCNC: 8.9 MG/DL — SIGNIFICANT CHANGE UP (ref 8.6–10.2)
CHLORIDE SERPL-SCNC: 101 MMOL/L — SIGNIFICANT CHANGE UP (ref 98–107)
CO2 SERPL-SCNC: 27 MMOL/L — SIGNIFICANT CHANGE UP (ref 22–29)
CREAT SERPL-MCNC: 0.53 MG/DL — SIGNIFICANT CHANGE UP (ref 0.5–1.3)
EOSINOPHIL # BLD AUTO: 0.2 K/UL — SIGNIFICANT CHANGE UP (ref 0–0.5)
EOSINOPHIL NFR BLD AUTO: 4.1 % — SIGNIFICANT CHANGE UP (ref 0–5)
GLUCOSE SERPL-MCNC: 101 MG/DL — SIGNIFICANT CHANGE UP (ref 70–115)
HAV IGM SER-ACNC: SIGNIFICANT CHANGE UP
HBV CORE IGM SER-ACNC: SIGNIFICANT CHANGE UP
HBV SURFACE AG SER-ACNC: SIGNIFICANT CHANGE UP
HCT VFR BLD CALC: 42 % — SIGNIFICANT CHANGE UP (ref 42–52)
HCV AB S/CO SERPL IA: 0.11 S/CO — SIGNIFICANT CHANGE UP
HCV AB SERPL-IMP: SIGNIFICANT CHANGE UP
HGB BLD-MCNC: 13.5 G/DL — LOW (ref 14–18)
LYMPHOCYTES # BLD AUTO: 1 K/UL — SIGNIFICANT CHANGE UP (ref 1–4.8)
LYMPHOCYTES # BLD AUTO: 25.4 % — SIGNIFICANT CHANGE UP (ref 20–55)
MAGNESIUM SERPL-MCNC: 1.9 MG/DL — SIGNIFICANT CHANGE UP (ref 1.8–2.6)
MCHC RBC-ENTMCNC: 27.1 PG — SIGNIFICANT CHANGE UP (ref 27–31)
MCHC RBC-ENTMCNC: 32.1 G/DL — SIGNIFICANT CHANGE UP (ref 32–36)
MCV RBC AUTO: 84.2 FL — SIGNIFICANT CHANGE UP (ref 80–94)
MONOCYTES # BLD AUTO: 0.3 K/UL — SIGNIFICANT CHANGE UP (ref 0–0.8)
MONOCYTES NFR BLD AUTO: 7 % — SIGNIFICANT CHANGE UP (ref 3–10)
NEUTROPHILS # BLD AUTO: 2.6 K/UL — SIGNIFICANT CHANGE UP (ref 1.8–8)
NEUTROPHILS NFR BLD AUTO: 63.5 % — SIGNIFICANT CHANGE UP (ref 37–73)
PHOSPHATE SERPL-MCNC: 3.1 MG/DL — SIGNIFICANT CHANGE UP (ref 2.4–4.7)
PLATELET # BLD AUTO: 205 K/UL — SIGNIFICANT CHANGE UP (ref 150–400)
POTASSIUM SERPL-MCNC: 3.9 MMOL/L — SIGNIFICANT CHANGE UP (ref 3.5–5.3)
POTASSIUM SERPL-SCNC: 3.9 MMOL/L — SIGNIFICANT CHANGE UP (ref 3.5–5.3)
PROT SERPL-MCNC: 6.5 G/DL — LOW (ref 6.6–8.7)
RBC # BLD: 4.99 M/UL — SIGNIFICANT CHANGE UP (ref 4.6–6.2)
RBC # FLD: 13.5 % — SIGNIFICANT CHANGE UP (ref 11–15.6)
SODIUM SERPL-SCNC: 139 MMOL/L — SIGNIFICANT CHANGE UP (ref 135–145)
TROPONIN T SERPL-MCNC: <0.01 NG/ML — SIGNIFICANT CHANGE UP (ref 0–0.06)
TYPE + AB SCN PNL BLD: SIGNIFICANT CHANGE UP
WBC # BLD: 4.1 K/UL — LOW (ref 4.8–10.8)
WBC # FLD AUTO: 4.1 K/UL — LOW (ref 4.8–10.8)

## 2018-11-30 PROCEDURE — 43264 ERCP REMOVE DUCT CALCULI: CPT

## 2018-11-30 PROCEDURE — 99254 IP/OBS CNSLTJ NEW/EST MOD 60: CPT | Mod: 25

## 2018-11-30 PROCEDURE — 43259 EGD US EXAM DUODENUM/JEJUNUM: CPT | Mod: 59

## 2018-11-30 PROCEDURE — 99233 SBSQ HOSP IP/OBS HIGH 50: CPT | Mod: GC

## 2018-11-30 PROCEDURE — 70450 CT HEAD/BRAIN W/O DYE: CPT | Mod: 26

## 2018-11-30 RX ORDER — INDOMETHACIN 50 MG
100 CAPSULE ORAL ONCE
Qty: 0 | Refills: 0 | Status: DISCONTINUED | OUTPATIENT
Start: 2018-11-30 | End: 2018-12-04

## 2018-11-30 RX ORDER — METOPROLOL TARTRATE 50 MG
25 TABLET ORAL EVERY 8 HOURS
Qty: 0 | Refills: 0 | Status: DISCONTINUED | OUTPATIENT
Start: 2018-11-30 | End: 2018-12-03

## 2018-11-30 RX ORDER — METOPROLOL TARTRATE 50 MG
5 TABLET ORAL ONCE
Qty: 0 | Refills: 0 | Status: COMPLETED | OUTPATIENT
Start: 2018-11-30 | End: 2018-12-02

## 2018-11-30 RX ORDER — ERTAPENEM SODIUM 1 G/1
1000 INJECTION, POWDER, LYOPHILIZED, FOR SOLUTION INTRAMUSCULAR; INTRAVENOUS ONCE
Qty: 0 | Refills: 0 | Status: DISCONTINUED | OUTPATIENT
Start: 2018-11-30 | End: 2018-12-02

## 2018-11-30 RX ORDER — METOPROLOL TARTRATE 50 MG
5 TABLET ORAL ONCE
Qty: 0 | Refills: 0 | Status: DISCONTINUED | OUTPATIENT
Start: 2018-11-30 | End: 2018-11-30

## 2018-11-30 RX ORDER — MAGNESIUM SULFATE 500 MG/ML
2 VIAL (ML) INJECTION ONCE
Qty: 0 | Refills: 0 | Status: COMPLETED | OUTPATIENT
Start: 2018-11-30 | End: 2018-11-30

## 2018-11-30 RX ADMIN — Medication 25 MILLIGRAM(S): at 18:16

## 2018-11-30 RX ADMIN — Medication 50 MILLIGRAM(S): at 05:48

## 2018-11-30 RX ADMIN — Medication 25 MILLIGRAM(S): at 14:41

## 2018-11-30 RX ADMIN — SODIUM CHLORIDE 125 MILLILITER(S): 9 INJECTION, SOLUTION INTRAVENOUS at 00:07

## 2018-11-30 RX ADMIN — Medication 81 MILLIGRAM(S): at 09:22

## 2018-11-30 RX ADMIN — SODIUM CHLORIDE 125 MILLILITER(S): 9 INJECTION, SOLUTION INTRAVENOUS at 09:23

## 2018-11-30 RX ADMIN — Medication 50 GRAM(S): at 09:22

## 2018-11-30 NOTE — CONSULT NOTE ADULT - ASSESSMENT
Patient with cholelithiasis, abnormal LFTs, biliary colic, atrial flutter, hyperthyroidism    Will schedule EUS and ERCP for today  No anticoagulation  Further recommendations after procedures  Antibiotic dose ordered  Indomethacin suppository ordered

## 2018-11-30 NOTE — PROGRESS NOTE ADULT - SUBJECTIVE AND OBJECTIVE BOX
Patient is a 33y old  Male who presents with a chief complaint of chest pain (30 Nov 2018 07:53)    INTERVAL/OVERNIGHT EVENTS  Patient this AM complaints of intermittent palpitations which were not present at the time of interview and mild dizziness. Reports improvement of his abdominal pain. No further complaints.  As per overnight nurse documentation, patient became tachycardic up to 150s but asymptomatic when ambulating to the restroom.    ROS: No chest pain, shortness of breath, nausea/vomiting, lightheadedness, LE edema or other symptoms.    Vital Signs Last 24 Hrs  T(C): 36.7 (30 Nov 2018 12:03), Max: 37.4 (30 Nov 2018 10:35)  T(F): 98 (30 Nov 2018 12:03), Max: 99.4 (30 Nov 2018 10:35)  HR: 74 (30 Nov 2018 12:03) (74 - 90)  BP: 126/74 (30 Nov 2018 12:03) (121/74 - 132/80)  BP(mean): --  RR: 16 (30 Nov 2018 12:03) (16 - 19)  SpO2: 97% (30 Nov 2018 12:03) (97% - 99%)    General: Adult  male, resting comfortably in bed in no acute distress.  HEENT: Normocephalic, atraumatic; EOMI, PERRLA; Moist mucous membranes  Respiratory: Normal respiratory rate and effort, lungs are clear to auscultation bilaterally, no wheeze, rales, rhonchi.  Cardiac: Regular rate and rhythm, normal +S1/S2, no murmurs, rubs or gallops appreciated  GI: +BS, soft, non distended, umbilical tenderness with guarding, no rebound. No palpable flank tenderness or mass.  Extremities: No cyanosis, edema or calf tenderness. 2+ radial and dorsalis pedal pulses  Neuro: AAOx3, no gross sensory or motor deficits.   Psych: Normal speech and affect, good eye contact.                          13.5   4.1   )-----------( 205      ( 30 Nov 2018 06:05 )             42.0     11-30    139  |  101  |  8.0  ----------------------------<  101  3.9   |  27.0  |  0.53    Ca    8.9      30 Nov 2018 06:05  Phos  3.1     11-30  Mg     1.9     11-30  TPro  6.5<L>  /  Alb  4.0  /  TBili  7.2<H>  /  DBili  4.7<H>  /  AST  212<H>  /  ALT  454<H>  /  AlkPhos  114  11-30    MEDICATIONS  (STANDING):  aspirin  chewable 81 milliGRAM(s) Oral daily  lactated ringers. 1000 milliLiter(s) (125 mL/Hr) IV Continuous <Continuous>  metoprolol tartrate 25 milliGRAM(s) Oral every 8 hours    MEDICATIONS  (PRN):  naproxen 375 milliGRAM(s) Oral every 12 hours PRN Mild Pain (1 - 3)  oxyCODONE    IR 5 milliGRAM(s) Oral every 8 hours PRN Severe Pain (7 - 10) cc: epigastric pain.     INTERVAL/OVERNIGHT EVENTS  Patient this AM complaints of intermittent palpitations which were not present at the time of interview and mild dizziness. Reports improvement of his abdominal pain. No further complaints.  As per overnight nurse documentation, patient became tachycardic up to 150s but asymptomatic when ambulating to the restroom.    ROS: No chest pain, shortness of breath, nausea/vomiting, lightheadedness, LE edema or other symptoms.    Vital Signs Last 24 Hrs  T(C): 36.7 (30 Nov 2018 12:03), Max: 37.4 (30 Nov 2018 10:35)  T(F): 98 (30 Nov 2018 12:03), Max: 99.4 (30 Nov 2018 10:35)  HR: 74 (30 Nov 2018 12:03) (74 - 90)  BP: 126/74 (30 Nov 2018 12:03) (121/74 - 132/80)  BP(mean): --  RR: 16 (30 Nov 2018 12:03) (16 - 19)  SpO2: 97% (30 Nov 2018 12:03) (97% - 99%)    General: Adult  male, resting comfortably in bed in no acute distress.  HEENT: Normocephalic, atraumatic; EOMI, PERRLA; Moist mucous membranes  Respiratory: Normal respiratory rate and effort, lungs are clear to auscultation bilaterally, no wheeze, rales, rhonchi.  Cardiac: Regular rate and rhythm, normal +S1/S2, no murmurs, rubs or gallops appreciated  GI: +BS, soft, non distended, umbilical tenderness with guarding, no rebound. No palpable flank tenderness or mass.  Extremities: No cyanosis, edema or calf tenderness. 2+ radial and dorsalis pedal pulses  Neuro: AAOx3, no gross sensory or motor deficits.   Psych: Normal speech and affect, good eye contact.                          13.5   4.1   )-----------( 205      ( 30 Nov 2018 06:05 )             42.0     11-30    139  |  101  |  8.0  ----------------------------<  101  3.9   |  27.0  |  0.53    Ca    8.9      30 Nov 2018 06:05  Phos  3.1     11-30  Mg     1.9     11-30  TPro  6.5<L>  /  Alb  4.0  /  TBili  7.2<H>  /  DBili  4.7<H>  /  AST  212<H>  /  ALT  454<H>  /  AlkPhos  114  11-30    MEDICATIONS  (STANDING):  aspirin  chewable 81 milliGRAM(s) Oral daily  lactated ringers. 1000 milliLiter(s) (125 mL/Hr) IV Continuous <Continuous>  metoprolol tartrate 25 milliGRAM(s) Oral every 8 hours    MEDICATIONS  (PRN):  naproxen 375 milliGRAM(s) Oral every 12 hours PRN Mild Pain (1 - 3)  oxyCODONE    IR 5 milliGRAM(s) Oral every 8 hours PRN Severe Pain (7 - 10)

## 2018-11-30 NOTE — CHART NOTE - NSCHARTNOTEFT_GEN_A_CORE
Called by RN as patient was found to have questionable seizure-like activities s/p ERCP where he was found having spontaneous involuntary jerk-like movements w/ inability to open eyes lasting a few minutes. Patient seen & examined at bedside. Pt denies any LOC, tongue biting, loss of bowel or bladder function, motor or sensory deficits, facial droop and confusion. Pt is unaware of any family history of seizures and have never been diagnosed. Patient is afebrile & hemodynamically stable. Neuro exam was benign w/ no focal deficits. Neurology consult called & urgent head CT to exclude TIA/CVA given patient's history of a-flutter not on anticoagulation. No anticoagulation at this time given patient is immediately post-op & at high-risk of bleeding. Cased discussed w/ attending on record (Dr. Lama). Case will be signed out to nocturnal SROC (Dr. Fink).

## 2018-11-30 NOTE — PROGRESS NOTE ADULT - SUBJECTIVE AND OBJECTIVE BOX
[facility], [facility]                                                    [address], [address], [place] [address]                                                         PHONE: [phone]    FAX: [phone] [address] [facility], [address], [place] [address]                                                 PHONE: [phone]    FAX: [phone]  *******************************************************************************    Overnight events/Subjective Assessment:    INTERPRETATION OF TELEMETRY (personally reviewed):    No Known Allergies    MEDICATIONS  (STANDING):  aspirin  chewable 81 milliGRAM(s) Oral daily  lactated ringers. 1000 milliLiter(s) (125 mL/Hr) IV Continuous <Continuous>  magnesium sulfate  IVPB 2 Gram(s) IV Intermittent once  metoprolol succinate ER 50 milliGRAM(s) Oral daily    MEDICATIONS  (PRN):  naproxen 375 milliGRAM(s) Oral every 12 hours PRN Mild Pain (1 - 3)  oxyCODONE    IR 5 milliGRAM(s) Oral every 8 hours PRN Severe Pain (7 - 10)      Vital Signs Last 24 Hrs  T(C): 37 (30 Nov 2018 05:48), Max: 37 (30 Nov 2018 05:48)  T(F): 98.6 (30 Nov 2018 05:48), Max: 98.6 (30 Nov 2018 05:48)  HR: 87 (30 Nov 2018 05:48) (75 - 87)  BP: 124/66 (30 Nov 2018 05:48) (121/74 - 148/91)  BP(mean): --  RR: 18 (30 Nov 2018 05:48) (18 - 18)  SpO2: 97% (30 Nov 2018 05:48) (97% - 99%)    I&O's Detail    29 Nov 2018 07:01  -  30 Nov 2018 07:00  --------------------------------------------------------  IN:    lactated ringers.: 1000 mL  Total IN: 1000 mL    OUT:    Voided: 400 mL  Total OUT: 400 mL    Total NET: 600 mL        I&O's Summary    29 Nov 2018 07:01  -  30 Nov 2018 07:00  --------------------------------------------------------  IN: 1000 mL / OUT: 400 mL / NET: 600 mL            PHYSICAL EXAM:  General: Appears well developed, well nourished, no acute distress  HEENT: Head: normocephalic, atraumatic  Eyes: Pupils equal and reactive  Neck: Supple, no carotid bruit, no JVD, no HJR  CARDIOVASCULAR: Normal S1 and S2, no murmur, rub, or gallop  LUNGS: Clear to auscultation bilaterally, no rales, rhonchi or wheeze  ABDOMEN: Soft, nontender, non-distended, positive bowel sounds, no mass or bruit  EXTREMITIES: No edema, distal pulses WNL  SKIN: Warm and dry with normal turgor  NEURO: Alert & oriented x 3, grossly intact  PSYCH: normal mood and affect        LABS:                        13.5   4.1   )-----------( 205      ( 30 Nov 2018 06:05 )             42.0     11-30    139  |  101  |  8.0  ----------------------------<  101  3.9   |  27.0  |  0.53    Ca    8.9      30 Nov 2018 06:05  Phos  3.1     11-30  Mg     1.9     11-30    TPro  6.5<L>  /  Alb  4.0  /  TBili  4.0<H>  /  DBili  2.0<H>  /  AST  597<H>  /  ALT  543<H>  /  AlkPhos  76  11-29    CARDIAC MARKERS ( 30 Nov 2018 03:43 )  x     / <0.01 ng/mL / x     / x     / x      CARDIAC MARKERS ( 29 Nov 2018 07:39 )  x     / <0.01 ng/mL / x     / x     / x      CARDIAC MARKERS ( 28 Nov 2018 19:32 )  x     / <0.01 ng/mL / x     / x     / x          PT/INR - ( 28 Nov 2018 19:32 )   PT: 12.2 sec;   INR: 1.06 ratio         PTT - ( 28 Nov 2018 19:32 )  PTT:30.0 sec  serum  Lipids:   Hemoglobin A1C, Whole Blood: 5.0 % (11-29 @ 14:08)    Thyroid Stimulating Hormone, Serum: <0.10 uIU/mL (11-29 @ 07:39)      RADIOLOGY & ADDITIONAL STUDIES:    ECG:    ECHO:    STRESS TEST:    CARDIAC CATHETERIZATION:    ASSESSMENT AND PLAN:  In summary, PIETER RIZZO is a 33y Male with past medical history significant for Acute cholecystitis, noted to have AFLUTTER no sx  - may proceed with or  - on BB, will adjust prn  - medicine team to check and supplement electrolytes, d/w RN  - low CHADSVASC score thus far  - echo reviewed ef -50-55%, lae  - next step( ablation etc) pending rhythm after surgery  - d/w pt       Hector Parks, DO

## 2018-11-30 NOTE — CONSULT NOTE ADULT - SUBJECTIVE AND OBJECTIVE BOX
HPI:  33 yom with PMH of Hyperthyroidism and Atrial Flutter, non-compliant with medication last taken over 1 year ago, no prior anticoagulant use, presents to ER complaining of sudden onset chest pain that began around 630pm. Says that he was just finished eating [employer] for dinner, then noted to experience a sharp 10/10 left sided chest pain that radiated to his right shoulder and back. Says that it was present constantly, exacerbated with movement. No alleviating factors noted. Associated with dizziness, palpitations, diaphoresis, nausea, vomit x1. Has never had symptoms like this before in past. States that he has also experienced an intermittent epigastric abdominal pain with occasional radiation to shoulder and with aggravation with food.  Alleviated with massaging his abdomen and with warm showers.      Noted to have cholelithiasis and mildly dilated bile duct with elevated bilirubin. Abdominal pain is better. Cleared by cardiology. Was scheduled for cholecystectomy. However, could not be done due to rising bilirubin. NPO.       PAST MEDICAL & SURGICAL HISTORY:  HTN (hypertension)  Hypothyroid  Atrial flutter  No significant past surgical history      ROS:  No Heartburn, regurgitation, dysphagia, odynophagia.  No dyspepsia  +abdominal pain.    + Nausea, vomiting.  No Bleeding.  No hematemesis.   No diarrhea.    No hematochesia.  No weight loss, anorexia.  No edema.      MEDICATIONS  (STANDING):  aspirin  chewable 81 milliGRAM(s) Oral daily  ertapenem  IVPB 1000 milliGRAM(s) IV Intermittent once  indomethacin Suppository 100 milliGRAM(s) Rectal once  lactated ringers. 1000 milliLiter(s) (125 mL/Hr) IV Continuous <Continuous>  metoprolol tartrate 25 milliGRAM(s) Oral every 8 hours    MEDICATIONS  (PRN):  naproxen 375 milliGRAM(s) Oral every 12 hours PRN Mild Pain (1 - 3)  oxyCODONE    IR 5 milliGRAM(s) Oral every 8 hours PRN Severe Pain (7 - 10)      Allergies    No Known Allergies    Intolerances        SOCIAL HISTORY:Occasional smoking. No drugs. No significant alcohol    ENDOSCOPIC/GI HISTORY:NC    FAMILY HISTORY:  Family history of cerebrovascular accident (CVA) (Mother)  Family history of coronary artery disease (Father)      Vital Signs Last 24 Hrs  T(C): 36.7 (2018 12:03), Max: 37.4 (2018 10:35)  T(F): 98 (2018 12:03), Max: 99.4 (2018 10:35)  HR: 74 (2018 12:03) (74 - 90)  BP: 126/74 (2018 12:03) (121/74 - 132/80)  BP(mean): --  RR: 16 (2018 12:03) (16 - 19)  SpO2: 97% (2018 12:03) (97% - 99%)    PHYSICAL EXAM:    GENERAL: NAD, well-groomed, well-developed  HEAD:  Atraumatic, Normocephalic  EYES: EOMI, PERRLA, conjunctiva and sclera clear  ENMT: No tonsillar erythema, exudates, or enlargement; Moist mucous membranes, Good dentition, No lesions  NECK: Supple, No JVD, Normal thyroid  CHEST/LUNG: Clear to percussion bilaterally; No rales, rhonchi, wheezing, or rubs  HEART: Regular rate and rhythm; No murmurs, rubs, or gallops  ABDOMEN: Soft, Nontender, Nondistended; Bowel sounds present  EXTREMITIES:  2+ Peripheral Pulses, No clubbing, cyanosis, or edema  LYMPH: No lymphadenopathy noted  SKIN: No rashes or lesions      LABS:                        13.5   4.1   )-----------( 205      ( 2018 06:05 )             42.0         139  |  101  |  8.0  ----------------------------<  101  3.9   |  27.0  |  0.53    Ca    8.9      2018 06:05  Phos  3.1     -  Mg     1.9         TPro  6.5<L>  /  Alb  4.0  /  TBili  7.2<H>  /  DBili  4.7<H>  /  AST  212<H>  /  ALT  454<H>  /  AlkPhos  114  30    PT/INR - ( 2018 19:32 )   PT: 12.2 sec;   INR: 1.06 ratio         PTT - ( 2018 19:32 )  PTT:30.0 sec   Urinalysis Basic - ( 2018 00:58 )    Color: Yellow / Appearance: Clear / S.025 / pH: x  Gluc: x / Ketone: Trace  / Bili: Negative / Urobili: 1 mg/dL   Blood: x / Protein: 15 mg/dL / Nitrite: Negative   Leuk Esterase: Trace / RBC: 0-2 /HPF / WBC 0-2   Sq Epi: x / Non Sq Epi: Occasional / Bacteria: Occasional        LIVER FUNCTIONS - ( 2018 06:05 )  Alb: 4.0 g/dL / Pro: 6.5 g/dL / ALK PHOS: 114 U/L / ALT: 454 U/L / AST: 212 U/L / GGT: x           Lipase, Serum (18 @ 19:32)    Lipase, Serum: 26 U/L        RADIOLOGY & ADDITIONAL STUDIES:  < from: US Abdomen Limited (18 @ 22:43) >   EXAM:  US ABDOMEN LIMITED                          PROCEDURE DATE:  2018          INTERPRETATION:  CLINICAL INFORMATION: Epigastric pain.    COMPARISON: None available.    TECHNIQUE: Sonography of the right upper quadrant.     FINDINGS:    Liver: 21 cm, enlarged.  Normal echogenicity.    Bile ducts: The common hepatic duct measures 9 mm in the proximal common   bile duct measures 6.6 mm.    Gallbladder: Sludge and multiple gallstones.  No gallbladder wall   thickening.  Sonographic Williamson sign was negative.        Pancreas: Obscured by bowel gas and not diagnostically assessed.    Right kidney: 10.9 cm No hydronephrosis.        Ascites: Trace ascites around the liver and gallbladder.    IVC: Visualized portions are within normal limits.    IMPRESSION:     1.  Cholelithiasis without sonographic evidence of acute cholecystitis.  2.  Mildly dilated extra hepatic bile duct.  The distal CBD is not   visualized and choledocholithiasis cannot be excluded on this   examination.  Correlate with liver function tests.  3.  Trace ascites around the liver and gallbladder.  4.  Hepatomegaly.  5.  The pancreas is obscured by bowel gas and not diagnostically assessed.    < end of copied text >

## 2018-11-30 NOTE — PROGRESS NOTE ADULT - ASSESSMENT
33 yom with PMH of Hyperthyroidism and Atrial Flutter, non-compliant with medication, who presented with acute onset chest pain. Found in ED to be in Aflutter with RVR s/p 10mg Cardizem in ER with rate control established. Incidentally found to have cholelithiasis without cholecystitis on US    Atrial Fibrillation/flutter with RVR  Likely due to noncompliance, patient self-discontinued meds.  s/p cardizem 10mg IVP in ER with rate control established but now with occasional episodes of nonsustained RVR.  Patient on metoprolol succinate 50mg daily which was his home medication, will change to metoprolol tartrate 25 mg TID for better rate control.  Atrial flutter noted on EKG   CHADVASC score 0-1, (?hx HTN), will continue on aspirin. Was never on anticoagulation  Will trend troponins, 1st trop negative   TTE ordered  Cardiology recommendations appreciated.    Biliary Colic/Cholelithiasis without Cholecystitis  Sonographic Williamson negative   Us noted for biliary sludge  Patient with history of frequent biliary cholic.   Surgery recommendations appreciated, plan for OR today @1300    Hyperthyroidism  TSH level decreased but normal T4 and T3.  Will hold from restarting methimazole, will need eventual follow up as outpatient.    Hyperglycemia  Noted on serum chemistry  Likely reactive, HbA1C: 5.0%    Transaminitis  Will avoid hepatotoxic agents for now  Will trend LFTs  Negative viral hepatitis panel.    Preventive Measure   VCD boots 33 yom with PMH of Hyperthyroidism and Atrial Flutter, non-compliant with medication, who presented with acute onset chest pain. Found in ED to be in Aflutter with RVR s/p 10mg Cardizem in ER with rate control established. Incidentally found to have cholelithiasis without cholecystitis on US    Atrial Fibrillation/flutter with RVR  Likely due to noncompliance, patient self-discontinued meds.  s/p cardizem 10mg IVP in ER with rate control established but now with occasional episodes of nonsustained RVR.  Patient on metoprolol succinate 50mg daily which was his home medication, will change to metoprolol tartrate 25 mg TID for better rate control.  Atrial flutter noted on EKG   CHADVASC score 0-1, (?hx HTN), will continue on aspirin. Was never on anticoagulation  Cardiology recommendations appreciated. cleared from cardio stand point for sx.     Biliary Colic/Cholelithiasis without Cholecystitis  Sonographic Williamson negative   Us noted for biliary sludge  Patient with history of frequent biliary cholic.   Surgery on board. recommend ERCP first. GI consult called.     Elevated LFTs   Will avoid hepatotoxic agents for now  Will trend LFTs  Negative viral hepatitis panel.    Hyperthyroidism  TSH level decreased but normal T4 and T3.  Will hold from restarting methimazole, will need eventual follow up as outpatient.    Hyperglycemia  Noted on serum chemistry  Likely reactive, HbA1C: 5.0%    Preventive Measure   VCD boots

## 2018-11-30 NOTE — BRIEF OPERATIVE NOTE - PROCEDURE
<<-----Click on this checkbox to enter Procedure ERCP with calculus removal from biliary duct  11/30/2018    Active  [id number]  ERCP with sphincterotomy  11/30/2018    Active  [id number]  Endoscopic ultrasound of bile duct  11/30/2018    Active  [id number]

## 2018-11-30 NOTE — BRIEF OPERATIVE NOTE - OPERATION/FINDINGS
Large amount of bile duct sludge and GB sludge on EUS  Choledocholithasis noted on EUS  ERCP with biliary sphincterotomy and balloon sweep extraction of stones, sludge performed  Bile duct clearance obtained

## 2018-11-30 NOTE — CHART NOTE - NSCHARTNOTEFT_GEN_A_CORE
Resident team paged by RN concerning for sustained afib in 130s to 150s. Ordered placed for 10mg IVP Cardizem at 10:05pm. RN then repaged resident service 50 minutes after initial order to question use of cardizem instead requesting use of betablocker citing cardiology documentation stating to avoid CCB meds. Team went to assess at bedside. Patient currently without symptoms, s/p oral lopressor 25mg at 615pm. Monitor reviewed, patient continued to remain tachy during the entirely of that 50 minute time window. Patient was previously broken from Afib with Cardizem.      While reviewing patient's chart primary cardiology notes, no documentation noted to avoid Calcium channel blocking medication for afib. During bedside assessment, HR noted between 95 and 105 on monitor. Discussed plan with patient that if HR remains elevated, will have PRN medication on standby.     Ordering 5mg PRN Lopressor IVP x1 if HR sustained above 120.     D/W SROC Dr. Fink who was at bedside, RN, Patient.

## 2018-12-01 DIAGNOSIS — R56.9 UNSPECIFIED CONVULSIONS: ICD-10-CM

## 2018-12-01 DIAGNOSIS — I48.92 UNSPECIFIED ATRIAL FLUTTER: ICD-10-CM

## 2018-12-01 LAB
ALBUMIN SERPL ELPH-MCNC: 3.9 G/DL — SIGNIFICANT CHANGE UP (ref 3.3–5.2)
ALP SERPL-CCNC: 143 U/L — HIGH (ref 40–120)
ALT FLD-CCNC: 330 U/L — HIGH
ANION GAP SERPL CALC-SCNC: 14 MMOL/L — SIGNIFICANT CHANGE UP (ref 5–17)
AST SERPL-CCNC: 92 U/L — HIGH
BILIRUB SERPL-MCNC: 3.4 MG/DL — HIGH (ref 0.4–2)
BUN SERPL-MCNC: 10 MG/DL — SIGNIFICANT CHANGE UP (ref 8–20)
CALCIUM SERPL-MCNC: 9.3 MG/DL — SIGNIFICANT CHANGE UP (ref 8.6–10.2)
CHLORIDE SERPL-SCNC: 101 MMOL/L — SIGNIFICANT CHANGE UP (ref 98–107)
CO2 SERPL-SCNC: 24 MMOL/L — SIGNIFICANT CHANGE UP (ref 22–29)
CREAT SERPL-MCNC: 0.51 MG/DL — SIGNIFICANT CHANGE UP (ref 0.5–1.3)
GLUCOSE SERPL-MCNC: 102 MG/DL — SIGNIFICANT CHANGE UP (ref 70–115)
HCT VFR BLD CALC: 42.5 % — SIGNIFICANT CHANGE UP (ref 42–52)
HGB BLD-MCNC: 13.9 G/DL — LOW (ref 14–18)
MAGNESIUM SERPL-MCNC: 2.1 MG/DL — SIGNIFICANT CHANGE UP (ref 1.8–2.6)
MCHC RBC-ENTMCNC: 26.9 PG — LOW (ref 27–31)
MCHC RBC-ENTMCNC: 32.7 G/DL — SIGNIFICANT CHANGE UP (ref 32–36)
MCV RBC AUTO: 82.4 FL — SIGNIFICANT CHANGE UP (ref 80–94)
PHOSPHATE SERPL-MCNC: 3.7 MG/DL — SIGNIFICANT CHANGE UP (ref 2.4–4.7)
PLATELET # BLD AUTO: 249 K/UL — SIGNIFICANT CHANGE UP (ref 150–400)
POTASSIUM SERPL-MCNC: 4.1 MMOL/L — SIGNIFICANT CHANGE UP (ref 3.5–5.3)
POTASSIUM SERPL-SCNC: 4.1 MMOL/L — SIGNIFICANT CHANGE UP (ref 3.5–5.3)
PROT SERPL-MCNC: 6.7 G/DL — SIGNIFICANT CHANGE UP (ref 6.6–8.7)
RBC # BLD: 5.16 M/UL — SIGNIFICANT CHANGE UP (ref 4.6–6.2)
RBC # FLD: 13 % — SIGNIFICANT CHANGE UP (ref 11–15.6)
SODIUM SERPL-SCNC: 139 MMOL/L — SIGNIFICANT CHANGE UP (ref 135–145)
WBC # BLD: 5.1 K/UL — SIGNIFICANT CHANGE UP (ref 4.8–10.8)
WBC # FLD AUTO: 5.1 K/UL — SIGNIFICANT CHANGE UP (ref 4.8–10.8)

## 2018-12-01 PROCEDURE — 99255 IP/OBS CONSLTJ NEW/EST HI 80: CPT

## 2018-12-01 PROCEDURE — 99232 SBSQ HOSP IP/OBS MODERATE 35: CPT

## 2018-12-01 PROCEDURE — 76536 US EXAM OF HEAD AND NECK: CPT | Mod: 26

## 2018-12-01 PROCEDURE — 99232 SBSQ HOSP IP/OBS MODERATE 35: CPT | Mod: GC

## 2018-12-01 RX ADMIN — Medication 25 MILLIGRAM(S): at 05:36

## 2018-12-01 RX ADMIN — Medication 25 MILLIGRAM(S): at 21:35

## 2018-12-01 RX ADMIN — Medication 25 MILLIGRAM(S): at 13:24

## 2018-12-01 RX ADMIN — Medication 81 MILLIGRAM(S): at 13:24

## 2018-12-01 NOTE — PROGRESS NOTE ADULT - ASSESSMENT
33 yom with PMH of Hyperthyroidism and Atrial Flutter, non-compliant with medication, who presented with acute onset chest pain.  Patient admitted for Afib w/ RVR    Atrial Fibrillation/flutter with RVR  Etiology 2/2 due to subclinical-Hyperthyroidism   - Patient non-complaint w/ medication  - metoprolol tartrate 25 mg TID  Atrial flutter noted on EKG  CHADVASC zero  - Cardiology consult    Subclinical hyperthyroidism  - methimazole 5mg QD  - Endocrinology consult ()    Biliary Colic/Cholelithiasis without Cholecystitis  Sonographic Williamson negative   Us noted for biliary sludge  Patient with history of frequent biliary cholic.   - NPO tonight, Cholecystomy  12/2/2018  - ERCP - performed- Evidence of biliry sludge and removal of stones      Elevated LFTs   Will avoid hepatotoxic agents for now  Will trend LFTs  Negative viral hepatitis panel.      Hyperglycemia  Noted on serum chemistry  Likely reactive, HbA1C: 5.0%    Preventive Measure   VCD boots 32 y/o male with PMH of Hyperthyroidism and Atrial Flutter, non-compliant with medication, who presented with acute onset chest pain.  Patient admitted for Afib w/ RVR and found to have Graves with mild hyperthyroidism. In the interim, found to have cholelithiasis w/o cholecystitis s/p ERCP with biliary sphincterotomy and removal of sludge and stone.    Atrial Fibrillation/flutter with RVR  Etiology 2/2 due to subclinical-Hyperthyroidism   -chads vasc 0, no a/c needed   - metoprolol tartrate 25 mg TID  -c/w tele   -beta blocker for now, uptitrate massimo and post op as needed  -seen by cardio and consult appreciated     Graves disease with mild hyperthyroidism  - methimazole 5mg resumed and seen by endocrine   - HR control and cleared for surgery in am tomorrow     Biliary Colic/Cholelithiasis without Cholecystitis with elevated lfts   Sonographic Williamson negative   Us noted for biliary sludge  - NPO tonight, Cholecystectomy  12/2/2018  - ERCP - performed- Evidence of biliary sludge and removal of stones  -LFTs trending down     Seizure activity  -concern for twitching by family   -seen by neuro   -monitor electrolytes   -ativan prn   -eeg     Hyperglycemia  Noted on serum chemistry  Likely reactive, HbA1C: 5.0%    Preventive Measure   VCD boots

## 2018-12-01 NOTE — PROGRESS NOTE ADULT - SUBJECTIVE AND OBJECTIVE BOX
[facility], [facility]                                          [address] , [address], [place] [address]                                               PHONE: [phone]    FAX: [phone] [address] [facility], [address], [place] [address]                                       PHONE: [phone]    FAX: [phone]  *******************************************************************************    Overnight events/Subjective Assessment: s/p ERCP, no CP, palpitations, syncope, no SOB    INTERPRETATION OF TELEMETRY (personally reviewed): AFlutter with variable conduction 70s-140s, PVCs vs aberrant beats    No Known Allergies    MEDICATIONS  (STANDING):  aspirin  chewable 81 milliGRAM(s) Oral daily  ertapenem  IVPB 1000 milliGRAM(s) IV Intermittent once  indomethacin Suppository 100 milliGRAM(s) Rectal once  lactated ringers. 1000 milliLiter(s) (125 mL/Hr) IV Continuous <Continuous>  metoprolol tartrate 25 milliGRAM(s) Oral every 8 hours    MEDICATIONS  (PRN):  metoprolol tartrate Injectable 5 milliGRAM(s) IV Push once PRN if HR sustained above 120.  naproxen 375 milliGRAM(s) Oral every 12 hours PRN Mild Pain (1 - 3)  oxyCODONE    IR 5 milliGRAM(s) Oral every 8 hours PRN Severe Pain (7 - 10)      Vital Signs Last 24 Hrs  T(C): 36.8 (01 Dec 2018 05:28), Max: 37.4 (30 Nov 2018 10:35)  T(F): 98.3 (01 Dec 2018 05:28), Max: 99.4 (30 Nov 2018 10:35)  HR: 77 (01 Dec 2018 05:28) (74 - 139)  BP: 110/58 (01 Dec 2018 05:28) (110/58 - 134/72)  BP(mean): --  RR: 16 (01 Dec 2018 05:28) (16 - 18)  SpO2: 99% (01 Dec 2018 05:28) (97% - 99%)    I&O's Detail    I&O's Summary          PHYSICAL EXAM:  General: Appears well developed, well nourished, no acute distress  HEENT: Head: normocephalic, atraumatic  Eyes: Pupils equal and reactive  Neck: Supple, no carotid bruit, no JVD, no HJR  CARDIOVASCULAR: irregular, Normal S1 and S2, no murmur, rub, or gallop  LUNGS: Clear to auscultation bilaterally, no rales, rhonchi or wheeze  ABDOMEN: Soft, nontender, non-distended, positive bowel sounds, no mass or bruit  EXTREMITIES: No edema, distal pulses WNL  SKIN: Warm and dry with normal turgor  NEURO: Alert & oriented x 3, grossly intact  PSYCH: normal mood and affect        LABS:                        13.9   5.1   )-----------( 249      ( 01 Dec 2018 06:28 )             42.5     12-01    139  |  101  |  10.0  ----------------------------<  102  4.1   |  24.0  |  0.51    Ca    9.3      01 Dec 2018 06:28  Phos  3.7     12-01  Mg     2.1     12-01    TPro  6.7  /  Alb  3.9  /  TBili  3.4<H>  /  DBili  x   /  AST  92<H>  /  ALT  330<H>  /  AlkPhos  143<H>  12-01    CARDIAC MARKERS ( 30 Nov 2018 03:43 )  x     / <0.01 ng/mL / x     / x     / x            serum  Lipids:   Hemoglobin A1C, Whole Blood: 5.0 % (11-29 @ 14:08)    Thyroid Stimulating Hormone, Serum: <0.10 uIU/mL (11-29 @ 07:39)      RADIOLOGY & ADDITIONAL STUDIES:    ECG:    ECHO:  < from: TTE Echo Complete w/Doppler (11.29.18 @ 10:05) >  PHYSICIAN INTERPRETATION:  Left Ventricle: The left ventricular internal cavity size is mildly   increased.  Global LV systolic function was Low normal. Left ventricular ejection   fraction, by visual estimation, is 50 to 55%. The mitral in-flow pattern   reveals no discernable A-wave, therefore no comment on diastolic function   can be made.  Right Ventricle: Normal right ventricular size andfunction.  Left Atrium: Mildly enlarged left atrium.  Right Atrium: Mildly enlarged right atrium.  Pericardium: There is no evidence of pericardial effusion.  Mitral Valve: Thickening of the anterior and posterior mitral valve   leaflets. Mild to moderate mitral valve regurgitation is seen.  Tricuspid Valve: The tricuspid valve is normal. Mild tricuspid   regurgitation is visualized. Estimated pulmonary artery systolic pressure   is 39.6 mmHg assuming a right atrial pressure of 15 mmHg, which is   consistent with borderline pulmonary hypertension.  Aortic Valve: Normal trileaflet aortic valve with normal opening. The   aortic valve is trileaflet. Trivial aortic valve regurgitation is seen.  Pulmonic Valve: Structurally normal pulmonic valve, with normal leaflet   excursion. Mild pulmonic valve regurgitation.  Aorta: The aortic root and ascending aorta are structurally normal, with   no evidence of dilitation.  Pulmonary Artery: The main pulmonary artery is normal in size.  Venous: The inferior vena cava was dilated, with respiratory size   variation less than 50%.       Summary:   1. Left ventricular ejection fraction, by visual estimation, is 50 to   55%.   2. Low normal global left ventricular systolic function.   3. The mitral in-flow pattern reveals no discernable A-wave, therefore   no comment on diastolic function can be made.   4. There is no evidence of pericardial effusion.   5. Thickening of the anterior and posterior mitral valve leaflets.   6. Mild tricuspid regurgitation.   7. Pulmonic valve regurgitation.   8. Estimated pulmonary artery systolic pressure is 39.6 mmHg assuming a   right atrial pressure of 15 mmHg, which is consistent with borderline   pulmonary hypertension.    < end of copied text >    ASSESSMENT AND PLAN:  In summary, PIETER RIZZO is a 33y Male with past medical history significant for hyperthyroidism (pt self discontinued methimazole), remote paroxysmal AF a/w abdominal pain and new onset atrial flutter    - AFib in past thought to be secondary to hyperthyroidism  - Unfortunately pt self-discontinued methimazole last year because of perceived side effects  - TSH < 0.1.  Untreated hyperthyroidism is most certainly contributing to his AFlutter, which is rapid at times  - Now s/p EUS showing choledocholithiasis and ERCP for biliary sphincterotomy and stone/sludge extraction  - post-procedure had possible seizure-like activity. Primary team to address  - Abx in place  - No indication for ASA  - I discussed with resident.  I recommend endocrinology consult to help with management of hypethyroidism.  - For now continue, Lopressor 25mg q8h with IVP Lopressor prn  - His HR trends to the 70s, but will inevitably increase with exertion.  He is young and can tolerate this in the short-term.  Expect this to improve with management of hyperthyroid state  - No indication for OAC as his CHADSVASc score is 0.  - Hopefully he returns to SR with management of hyperthyroid state.  Will defer any attempt at restoration of NSR (i.e DCCV or ablation) until he is euthyroid as recurrence is high otherwise.  - There are no cardiac contraindications to cholecystectomy, if indicated.  Perioperatively would expect tachycardia, which can be managed with BB therapy.      Ruddy Kuhn MD

## 2018-12-01 NOTE — PROGRESS NOTE ADULT - SUBJECTIVE AND OBJECTIVE BOX
Patient with episode of aflutter and questionable seizure last night. Now rate controlled. Doing well this morning. No complaints at this time with exception of continued abdominal pain. Denies nausea, vomiting , fever, chills.     MEDICATIONS  (STANDING):  aspirin  chewable 81 milliGRAM(s) Oral daily  ertapenem  IVPB 1000 milliGRAM(s) IV Intermittent once  indomethacin Suppository 100 milliGRAM(s) Rectal once  lactated ringers. 1000 milliLiter(s) (125 mL/Hr) IV Continuous <Continuous>  metoprolol tartrate 25 milliGRAM(s) Oral every 8 hours    MEDICATIONS  (PRN):  metoprolol tartrate Injectable 5 milliGRAM(s) IV Push once PRN if HR sustained above 120.  naproxen 375 milliGRAM(s) Oral every 12 hours PRN Mild Pain (1 - 3)  oxyCODONE    IR 5 milliGRAM(s) Oral every 8 hours PRN Severe Pain (7 - 10)      Vital Signs Last 24 Hrs  T(C): 36.8 (01 Dec 2018 05:28), Max: 37.4 (30 Nov 2018 10:35)  T(F): 98.3 (01 Dec 2018 05:28), Max: 99.4 (30 Nov 2018 10:35)  HR: 77 (01 Dec 2018 05:28) (74 - 139)  BP: 110/58 (01 Dec 2018 05:28) (110/58 - 134/72)  BP(mean): --  RR: 16 (01 Dec 2018 05:28) (16 - 18)  SpO2: 99% (01 Dec 2018 05:28) (97% - 99%)    PHYSICAL EXAM:  General: No acute distress  Respiratory: Nonlabored  Cardiovascular: S1/S2  Abdominal: Soft, mild epigastric tenderness. No guarding or rebound.   Extremities: Warm  Vascular: Radial Pulse 2+, bilaterally    I&O's Detail      LABS:                        13.9   5.1   )-----------( 249      ( 01 Dec 2018 06:28 )             42.5     12-01    139  |  101  |  10.0  ----------------------------<  102  4.1   |  24.0  |  0.51    Ca    9.3      01 Dec 2018 06:28  Phos  3.7     12-01  Mg     2.1     12-01    TPro  6.7  /  Alb  3.9  /  TBili  3.4<H>  /  DBili  x   /  AST  92<H>  /  ALT  330<H>  /  AlkPhos  143<H>  12-01          RADIOLOGY & ADDITIONAL STUDIES:

## 2018-12-01 NOTE — PROGRESS NOTE ADULT - SUBJECTIVE AND OBJECTIVE BOX
INTERVAL HPI/OVERNIGHT EVENTS:FU after ERCP for choledocholithiasis, obstructive jaundice. No complaints this am. LFTs better.     MEDICATIONS  (STANDING):  aspirin  chewable 81 milliGRAM(s) Oral daily  ertapenem  IVPB 1000 milliGRAM(s) IV Intermittent once  indomethacin Suppository 100 milliGRAM(s) Rectal once  lactated ringers. 1000 milliLiter(s) (125 mL/Hr) IV Continuous <Continuous>  metoprolol tartrate 25 milliGRAM(s) Oral every 8 hours    MEDICATIONS  (PRN):  metoprolol tartrate Injectable 5 milliGRAM(s) IV Push once PRN if HR sustained above 120.  naproxen 375 milliGRAM(s) Oral every 12 hours PRN Mild Pain (1 - 3)  oxyCODONE    IR 5 milliGRAM(s) Oral every 8 hours PRN Severe Pain (7 - 10)      Allergies    No Known Allergies    Intolerances        Vital Signs Last 24 Hrs  T(C): 36.8 (01 Dec 2018 05:28), Max: 36.8 (30 Nov 2018 17:42)  T(F): 98.3 (01 Dec 2018 05:28), Max: 98.3 (01 Dec 2018 05:28)  HR: 77 (01 Dec 2018 05:28) (74 - 139)  BP: 110/58 (01 Dec 2018 05:28) (110/58 - 134/72)  BP(mean): --  RR: 16 (01 Dec 2018 05:28) (16 - 18)  SpO2: 99% (01 Dec 2018 05:28) (97% - 99%)    LABS:                        13.9   5.1   )-----------( 249      ( 01 Dec 2018 06:28 )             42.5     12-01    139  |  101  |  10.0  ----------------------------<  102  4.1   |  24.0  |  0.51    Ca    9.3      01 Dec 2018 06:28  Phos  3.7     12-01  Mg     2.1     12-01    TPro  6.7  /  Alb  3.9  /  TBili  3.4<H>  /  DBili  x   /  AST  92<H>  /  ALT  330<H>  /  AlkPhos  143<H>  12-01          RADIOLOGY & ADDITIONAL TESTS:

## 2018-12-01 NOTE — CONSULT NOTE ADULT - SUBJECTIVE AND OBJECTIVE BOX
HPI:  33 yom with PMH of Hyperthyroidism and Atrial Flutter, non-compliant with medication last taken over 1 year ago, no prior anticoagulant use, presents to ER complaining of sudden onset chest pain that began around 630pm. Says that he was just finished eating [employer] for dinner, then noted to experience a sharp 10/10 left sided chest pain that radiated to his right shoulder and back. Says that it was present constantly, exacerbated with movement. No alleviating factors noted. Associated with dizziness, palpitations, diaphoresis, nausea, vomit x1. Has never had symptoms like this before in past. States that he has also experienced an intermittent epigastric abdominal pain with occasional radiation to shoulder and with aggravation with food.  Alleviated with massaging his abdomen and with warm showers.  In ED, (29 Nov 2018 01:30)  Pt. now seen s/p ERCP. Scheduled for lap/cholecystectomy  Past h/o hyperthyroidism discovered 2 years ago due to an episode of atrial flutter. Pt. seen by an endocrinologist, and started on methimazole. Due to symptoms of intermittent nausea pt. stopped therapy one year ago.      PAST MEDICAL & SURGICAL HISTORY:  HTN (hypertension)  Hyperthyroid  Atrial flutter  No significant past surgical history      FAMILY HISTORY:  Family history of cerebrovascular accident (CVA) (Mother)  Family history of coronary artery disease (Father)      SOCIAL HISTORY:    REVIEW OF SYSTEMS:    Constitutional: No fever, no chills. Weight loss    Eyes: No eye swelling, no blurry vision, no redness, no loss of vision.    Neck: No neck pain, no change in voice.    Lungs: mild exertional dyspnea    CV: No chest pain. Intermittent palpitations    GI: nausea and anorexia    : No urinary frequency, no blood in urine, no urinary burning, no difficulty voiding.    Musculoskeletal: No muscle pain, no joint pain, no swelling.    Skin: No rash, no infections.    Neurologic: transient dizziness; current evaluation for seizure disorder    Endocrine: No heat intolerance, no cold intolerance, no increased sweating, no shakiness between meals.    Psych: Anxiety and insomnia    MEDICATIONS  (STANDING):  aspirin  chewable 81 milliGRAM(s) Oral daily  ertapenem  IVPB 1000 milliGRAM(s) IV Intermittent once  indomethacin Suppository 100 milliGRAM(s) Rectal once  lactated ringers. 1000 milliLiter(s) (125 mL/Hr) IV Continuous <Continuous>  metoprolol tartrate 25 milliGRAM(s) Oral every 8 hours    MEDICATIONS  (PRN):  LORazepam   Injectable 2 milliGRAM(s) IV Push once PRN seizures  metoprolol tartrate Injectable 5 milliGRAM(s) IV Push once PRN if HR sustained above 120.  naproxen 375 milliGRAM(s) Oral every 12 hours PRN Mild Pain (1 - 3)  oxyCODONE    IR 5 milliGRAM(s) Oral every 8 hours PRN Severe Pain (7 - 10)      Allergies    No Known Allergies    Intolerances          PHYSICAL EXAM:    Vital Signs Last 24 Hrs  T(C): 36.5 (01 Dec 2018 16:07), Max: 36.8 (30 Nov 2018 17:42)  T(F): 97.7 (01 Dec 2018 16:07), Max: 98.3 (01 Dec 2018 05:28)  HR: 78 (01 Dec 2018 16:07) (77 - 139)  BP: 105/64 (01 Dec 2018 16:07) (105/64 - 134/72)  BP(mean): --  RR: 20 (01 Dec 2018 16:07) (15 - 20)  SpO2: 99% (01 Dec 2018 16:07) (98% - 99%)    General appearance: Well developed, well nourished.    Eyes: Pupils equal and reactive to light. EOM full. No exophthalmos. Mild icterus    Neck: Trachea midline. Mild homogenous thyroid enlargement    Lungs: Normal respiratory excursion. Lungs clear.    CV: Regular cardiac rhythm. No murmur.     Abdomen: Soft, non tender, no organomegaly or mass.    Musculoskeletal: No cyanosis, clubbing, or edema.     Skin: Warm and moist.     Neuro: Cranial nerves intact. Normal motor function. No tremor.    Psych: Normal affect, good judgement.            LABS:                        13.9   5.1   )-----------( 249      ( 01 Dec 2018 06:28 )             42.5     12-01    139  |  101  |  10.0  ----------------------------<  102  4.1   |  24.0  |  0.51    Ca    9.3      01 Dec 2018 06:28  Phos  3.7     12-01  Mg     2.1     12-01    TPro  6.7  /  Alb  3.9  /  TBili  3.4<H>  /  DBili  x   /  AST  92<H>  /  ALT  330<H>  /  AlkPhos  143<H>  12-01      LIVER FUNCTIONS - ( 01 Dec 2018 06:28 )  Alb: 3.9 g/dL / Pro: 6.7 g/dL / ALK PHOS: 143 U/L / ALT: 330 U/L / AST: 92 U/L / GGT: x           Hemoglobin A1C, Whole Blood: 5.0 % (11-29 @ 14:08)    T4, Serum: 11.1 ug/dL (11-29 @ 07:39) TSH <0.1 T3 170          RADIOLOGY & ADDITIONAL STUDIES:  no recent IV contrast

## 2018-12-01 NOTE — CONSULT NOTE ADULT - PROBLEM SELECTOR RECOMMENDATION 2
Episodes of seizure like activity could have toxic-metabolic etiology. can not rule out emobi given Aflutter. EEG STAT. If negative, no need for antiseizure medications.  Check Ca, and magnesium. Medical treatment. Seizure precautions.  If seizure greater than 2 minute, ativan 2 mg IVP and call neurology.

## 2018-12-01 NOTE — PROGRESS NOTE ADULT - SUBJECTIVE AND OBJECTIVE BOX
cc: epigastric pain.     INTERVAL/OVERNIGHT EVENTS    Patient seen and examined this morning. Patient denies any current complaints patient states he feels much better. Last BM yesterday morning, voiding well    Cardiac monitor reviewed: Evidence of bi-gemmeny, and A-flutter no evidence of sustained Tachy. HR w/ 70-80s      ROS: No chest pain, shortness of breath, nausea/vomiting, lightheadedness, LE edema or other symptoms.    Vital Signs   T(F): 98.3 (01 Dec 2018 05:28), Max: 98.3 (01 Dec 2018 05:28)  HR: 78 (01 Dec 2018 11:57) (77 - 139)  BP: 110/62 (01 Dec 2018 11:57) (110/58 - 134/72)  RR: 15 (01 Dec 2018 11:57) (15 - 18)  SpO2: 99% (01 Dec 2018 11:57) (98% - 99%)      General: Adult  male, resting comfortably in bed in no acute distress.  HEENT: Normocephalic, atraumatic; EOMI, PERRLA; Moist mucous membranes  Respiratory: Normal respiratory rate and effort, lungs are clear to auscultation bilaterally, no wheeze, rales, rhonchi.  Cardiac: Regular rate and rhythm, normal +S1/S2, no murmurs, rubs or gallops appreciated  GI: +BS, soft, non distended, umbilical tenderness with guarding, no rebound. No palpable flank tenderness or mass.  Extremities: No cyanosis, edema or calf tenderness. 2+ radial and dorsalis pedal pulses  Neuro: AAOx3, no gross sensory or motor deficits.   Psych: Normal speech and affect, good eye contact.                          13.9   5.1   )-----------( 249      ( 01 Dec 2018 06:28 )             42.5     01 Dec 2018 06:28    139    |  101    |  10.0   ----------------------------<  102    4.1     |  24.0   |  0.51     Ca    9.3        01 Dec 2018 06:28  Phos  3.7       01 Dec 2018 06:28  Mg     2.1       01 Dec 2018 06:28    TPro  6.7    /  Alb  3.9    /  TBili  3.4    /  DBili  x      /  AST  92     /  ALT  330    /  AlkPhos  143    01 Dec 2018 06:28    LIVER FUNCTIONS - ( 01 Dec 2018 06:28 )  Alb: 3.9 g/dL / Pro: 6.7 g/dL / ALK PHOS: 143 U/L / ALT: 330 U/L / AST: 92 U/L / GGT: x             CAPILLARY BLOOD GLUCOSE        CARDIAC MARKERS ( 30 Nov 2018 03:43 )  x     / <0.01 ng/mL / x     / x     / x          MEDICATIONS  (STANDING):  aspirin  chewable 81 milliGRAM(s) Oral daily  ertapenem  IVPB 1000 milliGRAM(s) IV Intermittent once  indomethacin Suppository 100 milliGRAM(s) Rectal once  lactated ringers. 1000 milliLiter(s) (125 mL/Hr) IV Continuous <Continuous>  metoprolol tartrate 25 milliGRAM(s) Oral every 8 hours    MEDICATIONS  (PRN):  LORazepam   Injectable 2 milliGRAM(s) IV Push once PRN seizures  metoprolol tartrate Injectable 5 milliGRAM(s) IV Push once PRN if HR sustained above 120.  naproxen 375 milliGRAM(s) Oral every 12 hours PRN Mild Pain (1 - 3)  oxyCODONE    IR 5 milliGRAM(s) Oral every 8 hours PRN Severe Pain (7 - 10) cc: epigastric pain.     INTERVAL/OVERNIGHT EVENTS    Patient seen and examined this morning. Patient denies any current complaints patient states he feels much better. Last BM yesterday morning, voiding well. Ambulated today, mild dizziness and no palpiations noted    Cardiac monitor reviewed: Evidence of bi-gemmeny, and A-flutter no evidence of sustained Tachy. HR w/ 70-80s    ROS: No chest pain, shortness of breath, nausea/vomiting, lightheadedness, LE edema or other symptoms. All other ROS negative     Vital Signs   T(F): 98.3 (01 Dec 2018 05:28), Max: 98.3 (01 Dec 2018 05:28)  HR: 78 (01 Dec 2018 11:57) (77 - 139)  BP: 110/62 (01 Dec 2018 11:57) (110/58 - 134/72)  RR: 15 (01 Dec 2018 11:57) (15 - 18)  SpO2: 99% (01 Dec 2018 11:57) (98% - 99%)    Physical exam;   General: Adult  male, resting comfortably in bed in no acute distress.  HEENT: Normocephalic, atraumatic; EOMI, PERRLA; Moist mucous membranes  Respiratory: Normal respiratory rate and effort, lungs are clear to auscultation bilaterally, no wheeze, rales, rhonchi.  Cardiac: Regular rate and rhythm, normal +S1/S2, no murmurs, rubs or gallops appreciated  GI: +BS, soft, non distended, umbilical tenderness with guarding, no rebound. No palpable flank tenderness or mass.  Extremities: No cyanosis, edema or calf tenderness. 2+ radial and dorsalis pedal pulses  Neuro: AAOx3, no gross sensory or motor deficits.   Psych: Normal speech and affect, good eye contact.                          13.9   5.1   )-----------( 249      ( 01 Dec 2018 06:28 )             42.5     01 Dec 2018 06:28    139    |  101    |  10.0   ----------------------------<  102    4.1     |  24.0   |  0.51     Ca    9.3        01 Dec 2018 06:28  Phos  3.7       01 Dec 2018 06:28  Mg     2.1       01 Dec 2018 06:28    TPro  6.7    /  Alb  3.9    /  TBili  3.4    /  DBili  x      /  AST  92     /  ALT  330    /  AlkPhos  143    01 Dec 2018 06:28    LIVER FUNCTIONS - ( 01 Dec 2018 06:28 )  Alb: 3.9 g/dL / Pro: 6.7 g/dL / ALK PHOS: 143 U/L / ALT: 330 U/L / AST: 92 U/L / GGT: x             CAPILLARY BLOOD GLUCOSE        CARDIAC MARKERS ( 30 Nov 2018 03:43 )  x     / <0.01 ng/mL / x     / x     / x          MEDICATIONS  (STANDING):  aspirin  chewable 81 milliGRAM(s) Oral daily  ertapenem  IVPB 1000 milliGRAM(s) IV Intermittent once  indomethacin Suppository 100 milliGRAM(s) Rectal once  lactated ringers. 1000 milliLiter(s) (125 mL/Hr) IV Continuous <Continuous>  methimazole 5 milliGRAM(s) Oral three times a day  metoprolol tartrate 25 milliGRAM(s) Oral every 8 hours    MEDICATIONS  (PRN):  LORazepam   Injectable 2 milliGRAM(s) IV Push once PRN seizures  metoprolol tartrate Injectable 5 milliGRAM(s) IV Push once PRN if HR sustained above 120.  naproxen 375 milliGRAM(s) Oral every 12 hours PRN Mild Pain (1 - 3)  oxyCODONE    IR 5 milliGRAM(s) Oral every 8 hours PRN Severe Pain (7 - 10)

## 2018-12-01 NOTE — PROGRESS NOTE ADULT - ASSESSMENT
33 year old Male admitted to medicine service for atrial flutter. Pt also complains of multiple episodes of epigastric pain following meals with an RUQ US showing sludge, elevated CBD, and cholelithiasis and elevated AST and ALT with normal TBili         PLAN:  - now s/p ERCP with elevation in Tbili  - Given cardiac history and aflutter episodes last night with possible seizure activity, will reschedule for OR 12/2 lap ba  - CLD now, NPO at midnight

## 2018-12-01 NOTE — PROGRESS NOTE ADULT - ASSESSMENT
Patient with cholelithiasis, choledocholithiasis s/p ERCP with biliary sphincterotomy and removal of sludge and stone. Feels better. Doing well.  Cholecystectomy as per surgical team'  Call GI prn

## 2018-12-01 NOTE — CONSULT NOTE ADULT - PROBLEM SELECTOR RECOMMENDATION 9
If persists, consider IV haparin until ablation to avoid stroke, if no contraindication given the fact the patient had recent ERCP.

## 2018-12-01 NOTE — CONSULT NOTE ADULT - SUBJECTIVE AND OBJECTIVE BOX
HPI:  33 yom with PMH of Hyperthyroidism and Atrial Flutter, non-compliant with medication last taken over 1 year ago, no prior anticoagulant use, presents to ER complaining of sudden onset chest pain that began around 630pm. Says that he was just finished eating [employer] for dinner, then noted to experience a sharp 10/10 left sided chest pain that radiated to his right shoulder and back. Says that it was present constantly, exacerbated with movement. No alleviating factors noted. Associated with dizziness, palpitations, diaphoresis, nausea, vomit x1. Has never had symptoms like this before in past. States that he has also experienced an intermittent epigastric abdominal pain with occasional radiation to shoulder and with aggravation with food.  Alleviated with massaging his abdomen and with warm showers.  In ED, (29 Nov 2018 01:30)  Neurology is called as patient experienced 1 minutes of tremors of all 4 extremities. He reports he was aware of the  episode but could not stop it. His eyes were closed. He was able to hear but could not respond. Was not confused after.  Had 3 more similar episodes during ambulance ride.  He is s/p ERCP.     Risk factors for seizures:  no prior history of seizures.  Grand mother had seizures. 3 years ago hit his head but no loss of consciousness.         PAST MEDICAL & SURGICAL HISTORY:  HTN (hypertension)  Hypothyroid  Atrial flutter  No significant past surgical history      MEDICATIONS  (STANDING):  aspirin  chewable 81 milliGRAM(s) Oral daily  ertapenem  IVPB 1000 milliGRAM(s) IV Intermittent once  indomethacin Suppository 100 milliGRAM(s) Rectal once  lactated ringers. 1000 milliLiter(s) (125 mL/Hr) IV Continuous <Continuous>  metoprolol tartrate 25 milliGRAM(s) Oral every 8 hours    MEDICATIONS  (PRN):  metoprolol tartrate Injectable 5 milliGRAM(s) IV Push once PRN if HR sustained above 120.  naproxen 375 milliGRAM(s) Oral every 12 hours PRN Mild Pain (1 - 3)  oxyCODONE    IR 5 milliGRAM(s) Oral every 8 hours PRN Severe Pain (7 - 10)      Allergies    No Known Allergies    Intolerances        SOCIAL HISTORY:    FAMILY HISTORY:  Family history of cerebrovascular accident (CVA) (Mother)  Family history of coronary artery disease (Father)      Vital Signs Last 24 Hrs  T(C): 36.8 (01 Dec 2018 05:28), Max: 37.4 (30 Nov 2018 10:35)  T(F): 98.3 (01 Dec 2018 05:28), Max: 99.4 (30 Nov 2018 10:35)  HR: 77 (01 Dec 2018 05:28) (74 - 139)  BP: 110/58 (01 Dec 2018 05:28) (110/58 - 134/72)  BP(mean): --  RR: 16 (01 Dec 2018 05:28) (16 - 18)  SpO2: 99% (01 Dec 2018 05:28) (97% - 99%)    Neurological Exam:  Patient is alert and oriented x 3. There is no aphasia or dysarthria. Follows complex commands. Vision is intact to confrontation.   Pupils are equal and reactive. Extra occular  muscles are intact. There is no facial droop or asymmetry. Tongue is midline.   Sensation is intact in the face. Other CN II-XII are intact.   On motor examination all muscles are 5/5 and there is no pronator drift. Sensory is intact to pinprick and touch. DTR are 2/4 all 4 extremities. Babinski is negative bilateral. Finger to finger to nose is intact. Heel to shin is intact bilaterally. Patient ambulates without difficulty. Romberg is negative.       LABS:                        13.9   5.1   )-----------( 249      ( 01 Dec 2018 06:28 )             42.5     12-01    139  |  101  |  10.0  ----------------------------<  102  4.1   |  24.0  |  0.51    Ca    9.3      01 Dec 2018 06:28  Phos  3.7     12-01  Mg     2.1     12-01    TPro  6.7  /  Alb  3.9  /  TBili  3.4<H>  /  DBili  x   /  AST  92<H>  /  ALT  330<H>  /  AlkPhos  143<H>  12-01          RADIOLOGY & ADDITIONAL STUDIES:  EXAM:  CT BRAIN                          PROCEDURE DATE:  11/30/2018          INTERPRETATION:  INDICATION:  TIA.  TECHNIQUE:  A non contrast 2.5mm axial CT study of the brain was   performed from skull base to vertex. Coronal and sagittal reformations   were generated from the axial data.  COMPARISON EXAMINATION:  No prior    FINDINGS:      HEMISPHERES:  No mass or space occupying lesion.  No acute ischemic   changes or hemorrhagic foci are suggested.  VENTRICLES:  Midline and normal in size.  POSTERIOR FOSSA:  The brain stem and cerebellum are unremarkable.  No CP   angle lesion noted.  EXTRACEREBRAL SPACES:  No subdural or epidural collections are noted.  SKULL BASE AND CALVARIUM:  Appears intact.  No fracture or destructive   lesion is identified.  SINUSES AND MASTOIDS:  Minimal thickening is noted in the sinuses.  MISCELLANEOUS:  No orbital or suprasellar abnormality noted.      IMPRESSION:    1)  unremarkable CT study of the brain.  2)  follow-up MR imaging may be considered for further assessment.

## 2018-12-02 DIAGNOSIS — R25.9 UNSPECIFIED ABNORMAL INVOLUNTARY MOVEMENTS: ICD-10-CM

## 2018-12-02 LAB
ALBUMIN SERPL ELPH-MCNC: 3.7 G/DL — SIGNIFICANT CHANGE UP (ref 3.3–5.2)
ALP SERPL-CCNC: 111 U/L — SIGNIFICANT CHANGE UP (ref 40–120)
ALT FLD-CCNC: 231 U/L — HIGH
ANION GAP SERPL CALC-SCNC: 12 MMOL/L — SIGNIFICANT CHANGE UP (ref 5–17)
AST SERPL-CCNC: 44 U/L — HIGH
BASOPHILS # BLD AUTO: 0 K/UL — SIGNIFICANT CHANGE UP (ref 0–0.2)
BASOPHILS NFR BLD AUTO: 0.2 % — SIGNIFICANT CHANGE UP (ref 0–2)
BILIRUB SERPL-MCNC: 1.6 MG/DL — SIGNIFICANT CHANGE UP (ref 0.4–2)
BLD GP AB SCN SERPL QL: SIGNIFICANT CHANGE UP
BUN SERPL-MCNC: 9 MG/DL — SIGNIFICANT CHANGE UP (ref 8–20)
CALCIUM SERPL-MCNC: 9.1 MG/DL — SIGNIFICANT CHANGE UP (ref 8.6–10.2)
CHLORIDE SERPL-SCNC: 101 MMOL/L — SIGNIFICANT CHANGE UP (ref 98–107)
CO2 SERPL-SCNC: 27 MMOL/L — SIGNIFICANT CHANGE UP (ref 22–29)
CREAT SERPL-MCNC: 0.63 MG/DL — SIGNIFICANT CHANGE UP (ref 0.5–1.3)
EOSINOPHIL # BLD AUTO: 0.2 K/UL — SIGNIFICANT CHANGE UP (ref 0–0.5)
EOSINOPHIL NFR BLD AUTO: 4.8 % — SIGNIFICANT CHANGE UP (ref 0–5)
GLUCOSE SERPL-MCNC: 94 MG/DL — SIGNIFICANT CHANGE UP (ref 70–115)
HCT VFR BLD CALC: 42 % — SIGNIFICANT CHANGE UP (ref 42–52)
HGB BLD-MCNC: 13.3 G/DL — LOW (ref 14–18)
LYMPHOCYTES # BLD AUTO: 2.4 K/UL — SIGNIFICANT CHANGE UP (ref 1–4.8)
LYMPHOCYTES # BLD AUTO: 53.6 % — SIGNIFICANT CHANGE UP (ref 20–55)
MAGNESIUM SERPL-MCNC: 2 MG/DL — SIGNIFICANT CHANGE UP (ref 1.8–2.6)
MCHC RBC-ENTMCNC: 26.5 PG — LOW (ref 27–31)
MCHC RBC-ENTMCNC: 31.7 G/DL — LOW (ref 32–36)
MCV RBC AUTO: 83.7 FL — SIGNIFICANT CHANGE UP (ref 80–94)
MONOCYTES # BLD AUTO: 0.3 K/UL — SIGNIFICANT CHANGE UP (ref 0–0.8)
MONOCYTES NFR BLD AUTO: 6.4 % — SIGNIFICANT CHANGE UP (ref 3–10)
NEUTROPHILS # BLD AUTO: 1.5 K/UL — LOW (ref 1.8–8)
NEUTROPHILS NFR BLD AUTO: 35 % — LOW (ref 37–73)
PHOSPHATE SERPL-MCNC: 3.8 MG/DL — SIGNIFICANT CHANGE UP (ref 2.4–4.7)
PLATELET # BLD AUTO: 227 K/UL — SIGNIFICANT CHANGE UP (ref 150–400)
POTASSIUM SERPL-MCNC: 3.7 MMOL/L — SIGNIFICANT CHANGE UP (ref 3.5–5.3)
POTASSIUM SERPL-SCNC: 3.7 MMOL/L — SIGNIFICANT CHANGE UP (ref 3.5–5.3)
PROT SERPL-MCNC: 6.3 G/DL — LOW (ref 6.6–8.7)
RBC # BLD: 5.02 M/UL — SIGNIFICANT CHANGE UP (ref 4.6–6.2)
RBC # FLD: 13.5 % — SIGNIFICANT CHANGE UP (ref 11–15.6)
SODIUM SERPL-SCNC: 140 MMOL/L — SIGNIFICANT CHANGE UP (ref 135–145)
TYPE + AB SCN PNL BLD: SIGNIFICANT CHANGE UP
WBC # BLD: 4.4 K/UL — LOW (ref 4.8–10.8)
WBC # FLD AUTO: 4.4 K/UL — LOW (ref 4.8–10.8)

## 2018-12-02 PROCEDURE — 99232 SBSQ HOSP IP/OBS MODERATE 35: CPT | Mod: GC

## 2018-12-02 PROCEDURE — 99233 SBSQ HOSP IP/OBS HIGH 50: CPT | Mod: GC

## 2018-12-02 RX ADMIN — Medication 25 MILLIGRAM(S): at 05:41

## 2018-12-02 RX ADMIN — Medication 25 MILLIGRAM(S): at 13:56

## 2018-12-02 RX ADMIN — Medication 5 MILLIGRAM(S): at 12:35

## 2018-12-02 RX ADMIN — SODIUM CHLORIDE 125 MILLILITER(S): 9 INJECTION, SOLUTION INTRAVENOUS at 00:13

## 2018-12-02 RX ADMIN — Medication 25 MILLIGRAM(S): at 21:21

## 2018-12-02 NOTE — PROGRESS NOTE ADULT - SUBJECTIVE AND OBJECTIVE BOX
No acute events over night. Neuro on board for seizure work up. No complaints this morning with the exception of mild abdominal pain. Denies fever, chills, nausea, vomiting    MEDICATIONS  (STANDING):  aspirin  chewable 81 milliGRAM(s) Oral daily  ertapenem  IVPB 1000 milliGRAM(s) IV Intermittent once  indomethacin Suppository 100 milliGRAM(s) Rectal once  lactated ringers. 1000 milliLiter(s) (125 mL/Hr) IV Continuous <Continuous>  methimazole 5 milliGRAM(s) Oral three times a day  metoprolol tartrate 25 milliGRAM(s) Oral every 8 hours    MEDICATIONS  (PRN):  LORazepam   Injectable 2 milliGRAM(s) IV Push once PRN seizures  metoprolol tartrate Injectable 5 milliGRAM(s) IV Push once PRN if HR sustained above 120.  naproxen 375 milliGRAM(s) Oral every 12 hours PRN Mild Pain (1 - 3)  oxyCODONE    IR 5 milliGRAM(s) Oral every 8 hours PRN Severe Pain (7 - 10)      Vital Signs Last 24 Hrs  T(C): 36.4 (02 Dec 2018 05:37), Max: 37 (01 Dec 2018 21:28)  T(F): 97.6 (02 Dec 2018 05:37), Max: 98.6 (01 Dec 2018 21:28)  HR: 83 (02 Dec 2018 05:37) (78 - 83)  BP: 111/74 (02 Dec 2018 05:37) (105/64 - 112/68)  BP(mean): --  RR: 16 (02 Dec 2018 05:37) (15 - 20)  SpO2: 100% (02 Dec 2018 05:37) (99% - 100%)    PHYSICAL EXAM:  General: No acute distress  Respiratory: Nonlabored  Cardiovascular: S1/S2  Abdominal: Soft, mild epigastric tenderness. No guarding or rebound.   Extremities: Warm  Vascular: Radial Pulse 2+, bilaterally        I&O's Detail    01 Dec 2018 07:01  -  02 Dec 2018 07:00  --------------------------------------------------------  IN:    lactated ringers.: 1000 mL    Oral Fluid: 50 mL  Total IN: 1050 mL    OUT:  Total OUT: 0 mL    Total NET: 1050 mL          LABS:                        13.3   4.4   )-----------( 227      ( 02 Dec 2018 06:00 )             42.0     12-02    140  |  101  |  9.0  ----------------------------<  94  3.7   |  27.0  |  0.63    Ca    9.1      02 Dec 2018 06:00  Phos  3.8     12-02  Mg     2.0     12-02    TPro  6.3<L>  /  Alb  3.7  /  TBili  1.6  /  DBili  x   /  AST  44<H>  /  ALT  231<H>  /  AlkPhos  111  12-02          RADIOLOGY & ADDITIONAL STUDIES:

## 2018-12-02 NOTE — PROGRESS NOTE ADULT - SUBJECTIVE AND OBJECTIVE BOX
[facility]                                          [address] , [address], [place] [address]                                               PHONE: [phone]    FAX: [phone] [address], [address], [place] [address]                                       PHONE: [phone]    FAX: [phone]  *******************************************************************************    Overnight events/Subjective Assessment: neuro work up pending.  no CP, palpitations, syncope, or SOB    INTERPRETATION OF TELEMETRY (personally reviewed): AFlutter with variable conduction 70s-160s, PVCs vs aberrant beats    No Known Allergies    MEDICATIONS  (STANDING):  ertapenem  IVPB 1000 milliGRAM(s) IV Intermittent once  indomethacin Suppository 100 milliGRAM(s) Rectal once  lactated ringers. 1000 milliLiter(s) (125 mL/Hr) IV Continuous <Continuous>  methimazole 5 milliGRAM(s) Oral three times a day  metoprolol tartrate 25 milliGRAM(s) Oral every 8 hours    MEDICATIONS  (PRN):  LORazepam   Injectable 2 milliGRAM(s) IV Push once PRN seizures  naproxen 375 milliGRAM(s) Oral every 12 hours PRN Mild Pain (1 - 3)  oxyCODONE    IR 5 milliGRAM(s) Oral every 8 hours PRN Severe Pain (7 - 10)      Vital Signs Last 24 Hrs  T(C): 36.4 (02 Dec 2018 05:37), Max: 37 (01 Dec 2018 21:28)  T(F): 97.6 (02 Dec 2018 05:37), Max: 98.6 (01 Dec 2018 21:28)  HR: 83 (02 Dec 2018 05:37) (79 - 83)  BP: 111/74 (02 Dec 2018 05:37) (111/74 - 112/68)  BP(mean): --  RR: 16 (02 Dec 2018 05:37) (16 - 16)  SpO2: 100% (02 Dec 2018 05:37) (99% - 100%)    I&O's Detail    01 Dec 2018 07:01  -  02 Dec 2018 07:00  --------------------------------------------------------  IN:    lactated ringers.: 1000 mL    Oral Fluid: 50 mL  Total IN: 1050 mL    OUT:  Total OUT: 0 mL    Total NET: 1050 mL        I&O's Summary    01 Dec 2018 07:01  -  02 Dec 2018 07:00  --------------------------------------------------------  IN: 1050 mL / OUT: 0 mL / NET: 1050 mL            PHYSICAL EXAM:  General: Appears well developed, well nourished, no acute distress  HEENT: Head: normocephalic, atraumatic  Eyes: Pupils equal and reactive  Neck: Supple, no carotid bruit, no JVD, no HJR  CARDIOVASCULAR: Normal S1 and S2, no murmur, rub, or gallop  LUNGS: Clear to auscultation bilaterally, no rales, rhonchi or wheeze  ABDOMEN: Soft, nontender, non-distended, positive bowel sounds, no mass or bruit  EXTREMITIES: No edema, distal pulses WNL  SKIN: Warm and dry with normal turgor  NEURO: Alert & oriented x 3, grossly intact  PSYCH: normal mood and affect        LABS:                        13.3   4.4   )-----------( 227      ( 02 Dec 2018 06:00 )             42.0     12-02    140  |  101  |  9.0  ----------------------------<  94  3.7   |  27.0  |  0.63    Ca    9.1      02 Dec 2018 06:00  Phos  3.8     12-02  Mg     2.0     12-02    TPro  6.3<L>  /  Alb  3.7  /  TBili  1.6  /  DBili  x   /  AST  44<H>  /  ALT  231<H>  /  AlkPhos  111  12-02          serum  Lipids:   Hemoglobin A1C, Whole Blood: 5.0 % (11-29 @ 14:08)    Thyroid Stimulating Hormone, Serum: <0.10 uIU/mL (11-29 @ 07:39)      ECHO:  < from: TTE Echo Complete w/Doppler (11.29.18 @ 10:05) >  PHYSICIAN INTERPRETATION:  Left Ventricle: The left ventricular internal cavity size is mildly   increased.  Global LV systolic function was Low normal. Left ventricular ejection   fraction, by visual estimation, is 50 to 55%. The mitral in-flow pattern   reveals no discernable A-wave, therefore no comment on diastolic function   can be made.  Right Ventricle: Normal right ventricular size andfunction.  Left Atrium: Mildly enlarged left atrium.  Right Atrium: Mildly enlarged right atrium.  Pericardium: There is no evidence of pericardial effusion.  Mitral Valve: Thickening of the anterior and posterior mitral valve   leaflets. Mild to moderate mitral valve regurgitation is seen.  Tricuspid Valve: The tricuspid valve is normal. Mild tricuspid   regurgitation is visualized. Estimated pulmonary artery systolic pressure   is 39.6 mmHg assuming a right atrial pressure of 15 mmHg, which is   consistent with borderline pulmonary hypertension.  Aortic Valve: Normal trileaflet aortic valve with normal opening. The   aortic valve is trileaflet. Trivial aortic valve regurgitation is seen.  Pulmonic Valve: Structurally normal pulmonic valve, with normal leaflet   excursion. Mild pulmonic valve regurgitation.  Aorta: The aortic root and ascending aorta are structurally normal, with   no evidence of dilitation.  Pulmonary Artery: The main pulmonary artery is normal in size.  Venous: The inferior vena cava was dilated, with respiratory size   variation less than 50%.       Summary:   1. Left ventricular ejection fraction, by visual estimation, is 50 to   55%.   2. Low normal global left ventricular systolic function.   3. The mitral in-flow pattern reveals no discernable A-wave, therefore   no comment on diastolic function can be made.   4. There is no evidence of pericardial effusion.   5. Thickening of the anterior and posterior mitral valve leaflets.   6. Mild tricuspid regurgitation.   7. Pulmonic valve regurgitation.   8. Estimated pulmonary artery systolic pressure is 39.6 mmHg assuming a   right atrial pressure of 15 mmHg, which is consistent with borderline   pulmonary hypertension.    < end of copied text >    ASSESSMENT AND PLAN:  33y Male with past medical history significant for hyperthyroidism (pt self discontinued methimazole), remote paroxysmal AF a/w abdominal pain and new onset atrial flutter.    - AFib in past thought to be secondary to hyperthyroidism  - Unfortunately pt self-discontinued methimazole last year because of perceived side effects.  Now seen by endocrine and methimazole restarted  - TSH < 0.1.  Untreated hyperthyroidism is most certainly contributing to his AFlutter, which is rapid at times  - Now s/p EUS showing choledocholithiasis and ERCP for biliary sphincterotomy and stone/sludge extraction  - post-procedure had possible seizure-like activity. Primary team to address.  Neuro work up underway  - Abx in place  - No indication for ASA  - Increase Lopressor 50mg to q8h with IVP Lopressor prn  - His HR trends to the 70s, but will inevitably increase with exertion.  He is young and can tolerate this in the short-term.  Expect this to improve with management of hyperthyroid state  - No indication for OAC as his CHADSVASc score is 0.  - Hopefully he returns to SR with management of hyperthyroid state.  Will defer any attempt at restoration of NSR (i.e DCCV or ablation) until he is euthyroid as recurrence is high otherwise.  Should DCCV be pursued, he will need to be on AC for at least 1 month following cardioversion.  This will interfere with plan for surgery.  - There are no cardiac contraindications to cholecystectomy which is on hold until neuro work up is completed.  Perioperatively would expect tachycardia, which can be managed with BB therapy.      Ruddy Kuhn MD

## 2018-12-02 NOTE — PROGRESS NOTE ADULT - ASSESSMENT
34 y/o male with PMH of Graves hyperthyroidism and Atrial Flutter, non-compliant with medication, presented with acute onset chest pain and found to be in Aflutter w/ RVR in the ED. Now rate controlled and chest pain has since resolved. Also found to have cholelithiasis w/o cholecystitis s/p ERCP with biliary sphincterotomy and removal of sludge and stone on 11/30.    Atrial Fibrillation/flutter with RVR  - Etiology secondary due to subclinical-Hyperthyroidism   - chadsvasc 0, no AC or aspirin indicated  - c/w metoprolol tartrate 25 mg q8h  - c/w tele   - per cardio will defer cardioversion or ablation until euthyroid state is achieved as recurrence is high  - beta blocker for now, uptitrate massimo and post op as needed  - seen by Cardio () and consult appreciated     Graves disease with mild hyperthyroidism  - methimazole 5mg resumed   - no PTU due to elevated lfts  - may need future therapy with MARTINES or surgery - can be discussed more outpt  - Endocrine consulted    Biliary Colic/Cholelithiasis without Cholecystitis with elevated lfts   - Sonographic Williamson negative   - US noted for biliary sludge  - lap ba rescheduled to 12/3 due to pending EEG and MRI; will keep npo at midnight for surgery tomorrow  - ERCP performed 11/30 - removal of stone and biliary sludge with sphincterotomy  - LFTs trending down     Seizure activity  - questionable seizure-like activities s/p ERCP  - possible metabolic etiology, but cannot rule out embolus given Afib/Aflutter  - monitor electrolytes   - EEG and MRI head pending  - ativan prn   - seizure precautions  - Neuro consulted    Preventive Measure   VCD boots 34 y/o male with PMH of Graves hyperthyroidism and Atrial Flutter, non-compliant with medication, presented with acute onset chest pain and found to be in Aflutter w/ RVR in the ED. Now rate controlled and chest pain has since resolved. Also found to have cholelithiasis w/o cholecystitis s/p ERCP with biliary sphincterotomy and removal of sludge and stone on 11/30.    Atrial Fibrillation/flutter with RVR  - Etiology secondary due to subclinical-Hyperthyroidism   - chadsvasc 0, no AC or aspirin indicated  - c/w metoprolol tartrate 25 mg q8h  - c/w tele   - per cardio will defer cardioversion or ablation until euthyroid state is achieved as recurrence is high  - beta blocker for now, uptitrate massimo and post op as needed  - seen by Cardio ([facility]) and consult appreciated     Graves disease with mild hyperthyroidism  - methimazole 5mg resumed   - no PTU due to elevated lfts  - may need future therapy with MARTINES or surgery - can be discussed more outpt  - Endocrine consulted    Biliary Colic/Cholelithiasis without Cholecystitis with elevated lfts   - Sonographic Williamson negative   - US noted for biliary sludge  - lap ba rescheduled to 12/3 due to pending EEG and MRI; will keep npo at midnight for surgery tomorrow  - ERCP performed 11/30 - removal of stone and biliary sludge with sphincterotomy  - LFTs and Tbili trending down     Seizure activity  - questionable seizure-like activities s/p ERCP  - possible metabolic etiology, but cannot rule out embolus given Afib/Aflutter  - monitor electrolytes   - EEG and MRI head pending  - ativan prn   - seizure precautions  - Neuro consulted    Preventive Measure   VCD boots 34 y/o male with PMH of Graves hyperthyroidism and Atrial Flutter, non-compliant with medication, presented with acute onset chest pain and found to be in Aflutter w/ RVR in the ED. Now rate controlled and chest pain has since resolved. Also found to have cholelithiasis w/o cholecystitis s/p ERCP with biliary sphincterotomy and removal of sludge and stone on 11/30.    Atrial Fibrillation/flutter with RVR  - Etiology secondary due to hyperthyroidism and non-compliance with meds  - now rate controlled  - c/w metoprolol tartrate 25 mg q8h  - chadsvasc 0, no AC or aspirin indicated  - c/w tele   - per cardio will defer cardioversion or ablation until euthyroid state is achieved as recurrence is high  - beta blocker for now, uptitrate massimo and post op as needed  - seen by Cardio ([facility]) and consult appreciated     Graves disease with mild hyperthyroidism  - methimazole 5mg resumed   - no PTU due to elevated lfts  - may need future therapy with MARTINES or surgery - can be discussed more outpt  - Endocrine consulted    Biliary Colic/Cholelithiasis without Cholecystitis with elevated lfts   - Sonographic Williamson negative   - US noted for biliary sludge  - lap ba rescheduled to 12/3 due to pending EEG and MRI; will keep npo at midnight for surgery tomorrow  - ERCP performed 11/30 - removal of stone and biliary sludge with sphincterotomy  - LFTs and Tbili trending down     Seizure activity  - questionable seizure-like activities s/p ERCP  - possible metabolic etiology, but cannot rule out embolus given Afib/Aflutter  - monitor electrolytes   - EEG and MRI head pending  - ativan prn   - seizure precautions  - Neuro consulted    Preventive Measure   VCD boots 32 y/o male with PMH of Graves hyperthyroidism and Atrial Flutter, non-compliant with medication, presented with acute onset chest pain and found to be in Aflutter w/ RVR in the ED. Now rate controlled and chest pain has since resolved. Also found to have cholelithiasis w/o cholecystitis s/p ERCP with biliary sphincterotomy and removal of sludge and stone on 11/30. During his hospital course, patients family informed team of focal twitching. Per patient, he gets episodes where he gets stiff, twitches and can't verbalize. Neuro brought on board and eeg/mri to be done     Atrial Fibrillation/flutter with RVR  - Etiology secondary due to hyperthyroidism and non-compliance with meds  -improved rate   - c/w metoprolol tartrate 25 mg q8h; per cardio - titrate up to 50mg q8h however, bp trends are lower and will maintain this dose for now   - chadsvasc 0, no AC or aspirin indicated  - per cardio will defer cardioversion or ablation until euthyroid state is achieved as recurrence is high  - beta blocker for now, uptitrate massimo and post op as needed  - seen by Cardio ([facility]) and consult appreciated   -d/c asa     Graves disease with mild hyperthyroidism  - methimazole 5mg resumed   - no PTU due to elevated lfts  - may need future therapy with MARTINES or surgery - can be discussed more outpt  - Endocrine consulted    Biliary Colic/Cholelithiasis without Cholecystitis with elevated lfts   - Sonographic Williamson negative, US noted for biliary sludge  - lap ba rescheduled to 12/3 due to pending EEG and MRI; will keep npo at midnight for surgery tomorrow  - ERCP performed 11/30 - removal of stone and biliary sludge with sphincterotomy  - LFTs and Tbili trending down   -surgery team not comfortable with doing surgical intervention today as planned due to seizure w/up     Seizure activity  - questionable seizure-like activities s/p ERCP  - possible metabolic etiology, but cannot rule out embolus given Afib/Aflutter  - monitor electrolytes, EEG and MRI head pending  - ativan prn   - seizure precautions, Neuro consulted    Preventive Measure   VCD boots

## 2018-12-02 NOTE — PROGRESS NOTE ADULT - ASSESSMENT
33 year old male admitted to medicine service for atrial flutter. Pt also complains of multiple episodes of epigastric pain following meals with an RUQ US showing sludge, elevated CBD, and cholelithiasis and elevated AST and ALT with normal TBili       PLAN:  - now s/p ERCP with elevation in Tbili  - Neuro to work up patient for seizure activity, EEG pending  - Will reschedule lap ba for 12/3

## 2018-12-02 NOTE — PROGRESS NOTE ADULT - SUBJECTIVE AND OBJECTIVE BOX
INTERVAL HISTORY:  Seen at bedside; no new changes; awaits for CCY.  No further evnts.    No Known Allergies      VITAL SIGNS:  Vital Signs Last 24 Hrs  T(C): 36.4 (02 Dec 2018 05:37), Max: 37 (01 Dec 2018 21:28)  T(F): 97.6 (02 Dec 2018 05:37), Max: 98.6 (01 Dec 2018 21:28)  HR: 83 (02 Dec 2018 05:37) (78 - 83)  BP: 111/74 (02 Dec 2018 05:37) (105/64 - 112/68)  BP(mean): --  RR: 16 (02 Dec 2018 05:37) (16 - 20)  SpO2: 100% (02 Dec 2018 05:37) (99% - 100%)    PHYSICAL EXAMINATION:  General: Well-developed, well nourished, in no acute distress.  Eyes: Conjunctiva and sclera clear.  Neurologic:  - Mental Status:  Alert, awake, oriented to person, place, and time; Speech is normal; Affect is normal.  - Cranial Nerves: II-XI intact.  - Motor:  Strength is 5/5 throughout.  There is no pronator drift.  Normal muscle bulk and tone throughout.  - Reflexes:  2+ throughout  - Sensory:  Intact to light touch, pin prick, vibration, and joint-position sense throughout.  - Coordination:  No dysmetria/dysdiadochokinesis.    MEDS:  MEDICATIONS  (STANDING):  ertapenem  IVPB 1000 milliGRAM(s) IV Intermittent once  indomethacin Suppository 100 milliGRAM(s) Rectal once  lactated ringers. 1000 milliLiter(s) (125 mL/Hr) IV Continuous <Continuous>  methimazole 5 milliGRAM(s) Oral three times a day  metoprolol tartrate 25 milliGRAM(s) Oral every 8 hours    MEDICATIONS  (PRN):  LORazepam   Injectable 2 milliGRAM(s) IV Push once PRN seizures  metoprolol tartrate Injectable 5 milliGRAM(s) IV Push once PRN if HR sustained above 120.  naproxen 375 milliGRAM(s) Oral every 12 hours PRN Mild Pain (1 - 3)  oxyCODONE    IR 5 milliGRAM(s) Oral every 8 hours PRN Severe Pain (7 - 10)      LABS:                          13.3   4.4   )-----------( 227      ( 02 Dec 2018 06:00 )             42.0     12-02    140  |  101  |  9.0  ----------------------------<  94  3.7   |  27.0  |  0.63    Ca    9.1      02 Dec 2018 06:00  Phos  3.8     12-02  Mg     2.0     12-02    TPro  6.3<L>  /  Alb  3.7  /  TBili  1.6  /  DBili  x   /  AST  44<H>  /  ALT  231<H>  /  AlkPhos  111  12-02    LIVER FUNCTIONS - ( 02 Dec 2018 06:00 )  Alb: 3.7 g/dL / Pro: 6.3 g/dL / ALK PHOS: 111 U/L / ALT: 231 U/L / AST: 44 U/L / GGT: x           CT Head No Cont (11.30.18 @ 21:57) >  1)  unremarkable CT study of the brain.  2)  follow-up MR imaging may be considered for further assessment.

## 2018-12-02 NOTE — PROGRESS NOTE ADULT - SUBJECTIVE AND OBJECTIVE BOX
cc: epigastric pain.     Overnight/AM Events:  Patient seen and examined this morning. He has been npo since midnight for lap ba today. However, surgery was cancelled by surgical team this morning and to be done rescheduled after EEG and MRI head are completed. This morning at bedside patient denies chest pain, palpitations, sob, dizziness. He is ambulating and voiding without difficulties.     ROS: No chest pain, shortness of breath, nausea/vomiting, lightheadedness, LE edema or other symptoms. All other ROS negative     Cardiac monitor: A-flutter, episode of tachycardia with hr 150s (~10mins), intermittent missed beats    Vital Signs Last 24 Hrs  T(C): 36.4 (02 Dec 2018 05:37), Max: 37 (01 Dec 2018 21:28)  T(F): 97.6 (02 Dec 2018 05:37), Max: 98.6 (01 Dec 2018 21:28)  HR: 83 (02 Dec 2018 05:37) (78 - 83)  BP: 111/74 (02 Dec 2018 05:37) (105/64 - 112/68)  RR: 16 (02 Dec 2018 05:37) (16 - 20)  SpO2: 100% (02 Dec 2018 05:37) (99% - 100%)    Physical exam:  Gen: Adult  male, NAD, comfortable  Head: NC/AT  Eyes: EOMI, Perrla 3mm b/l  Throat: mist mucous membranes, tonsils normal  Pulm: CTAB, no wheezes or crackles  Cardio: RRR, normal +S1/S2, no murmurs  GI: +BS, soft, non distended, non-tender, no guarding  Extremities: no pitting edema, no calf tenderness. 2+ radial and dorsalis pedal pulses  Neuro: alert and oriented, no gross sensory or motor deficits.   Psych: Normal speech and affect, good eye contact.    Labs:                        13.3   4.4   )-----------( 227      ( 02 Dec 2018 06:00 )             42.0   12-02    140  |  101  |  9.0  ----------------------------<  94  3.7   |  27.0  |  0.63    Ca    9.1      02 Dec 2018 06:00  Phos  3.8     12-02  Mg     2.0     12-02    TPro  6.3<L>  /  Alb  3.7  /  TBili  1.6  /  DBili  x   /  AST  44<H>  /  ALT  231<H>  /  AlkPhos  111  12-02              MEDICATIONS  (STANDING):  ertapenem  IVPB 1000 milliGRAM(s) IV Intermittent once  indomethacin Suppository 100 milliGRAM(s) Rectal once  lactated ringers. 1000 milliLiter(s) (125 mL/Hr) IV Continuous <Continuous>  methimazole 5 milliGRAM(s) Oral three times a day  metoprolol tartrate 25 milliGRAM(s) Oral every 8 hours    MEDICATIONS  (PRN):  LORazepam   Injectable 2 milliGRAM(s) IV Push once PRN seizures  naproxen 375 milliGRAM(s) Oral every 12 hours PRN Mild Pain (1 - 3)  oxyCODONE    IR 5 milliGRAM(s) Oral every 8 hours PRN Severe Pain (7 - 10)

## 2018-12-02 NOTE — PROGRESS NOTE ADULT - ASSESSMENT
Impression:  Involuntary movements/tremors to further assess for any occult seizure    Plan:    Await for EEG as ordered.  MRI Brain with and without damon seizure protocol.  DVT prophylaxis recommended.  Maintain seizure precautions  Follow per medicine/surgery.  D/w pt, wife and medical team in great detail.  Will follow.

## 2018-12-03 ENCOUNTER — TRANSCRIPTION ENCOUNTER (OUTPATIENT)
Age: 33
End: 2018-12-03

## 2018-12-03 LAB
ANION GAP SERPL CALC-SCNC: 12 MMOL/L — SIGNIFICANT CHANGE UP (ref 5–17)
BASOPHILS # BLD AUTO: 0 K/UL — SIGNIFICANT CHANGE UP (ref 0–0.2)
BASOPHILS NFR BLD AUTO: 0.2 % — SIGNIFICANT CHANGE UP (ref 0–2)
BUN SERPL-MCNC: 11 MG/DL — SIGNIFICANT CHANGE UP (ref 8–20)
CALCIUM SERPL-MCNC: 9.3 MG/DL — SIGNIFICANT CHANGE UP (ref 8.6–10.2)
CHLORIDE SERPL-SCNC: 102 MMOL/L — SIGNIFICANT CHANGE UP (ref 98–107)
CO2 SERPL-SCNC: 26 MMOL/L — SIGNIFICANT CHANGE UP (ref 22–29)
CREAT SERPL-MCNC: 0.65 MG/DL — SIGNIFICANT CHANGE UP (ref 0.5–1.3)
EOSINOPHIL # BLD AUTO: 0.2 K/UL — SIGNIFICANT CHANGE UP (ref 0–0.5)
EOSINOPHIL NFR BLD AUTO: 4.5 % — SIGNIFICANT CHANGE UP (ref 0–5)
GLUCOSE SERPL-MCNC: 108 MG/DL — SIGNIFICANT CHANGE UP (ref 70–115)
HCT VFR BLD CALC: 43.2 % — SIGNIFICANT CHANGE UP (ref 42–52)
HGB BLD-MCNC: 14.2 G/DL — SIGNIFICANT CHANGE UP (ref 14–18)
LYMPHOCYTES # BLD AUTO: 2.4 K/UL — SIGNIFICANT CHANGE UP (ref 1–4.8)
LYMPHOCYTES # BLD AUTO: 45.9 % — SIGNIFICANT CHANGE UP (ref 20–55)
MCHC RBC-ENTMCNC: 27.2 PG — SIGNIFICANT CHANGE UP (ref 27–31)
MCHC RBC-ENTMCNC: 32.9 G/DL — SIGNIFICANT CHANGE UP (ref 32–36)
MCV RBC AUTO: 82.6 FL — SIGNIFICANT CHANGE UP (ref 80–94)
MONOCYTES # BLD AUTO: 0.4 K/UL — SIGNIFICANT CHANGE UP (ref 0–0.8)
MONOCYTES NFR BLD AUTO: 8.5 % — SIGNIFICANT CHANGE UP (ref 3–10)
NEUTROPHILS # BLD AUTO: 2.1 K/UL — SIGNIFICANT CHANGE UP (ref 1.8–8)
NEUTROPHILS NFR BLD AUTO: 40.7 % — SIGNIFICANT CHANGE UP (ref 37–73)
PLATELET # BLD AUTO: 249 K/UL — SIGNIFICANT CHANGE UP (ref 150–400)
POTASSIUM SERPL-MCNC: 3.7 MMOL/L — SIGNIFICANT CHANGE UP (ref 3.5–5.3)
POTASSIUM SERPL-SCNC: 3.7 MMOL/L — SIGNIFICANT CHANGE UP (ref 3.5–5.3)
RBC # BLD: 5.23 M/UL — SIGNIFICANT CHANGE UP (ref 4.6–6.2)
RBC # FLD: 13.4 % — SIGNIFICANT CHANGE UP (ref 11–15.6)
SODIUM SERPL-SCNC: 140 MMOL/L — SIGNIFICANT CHANGE UP (ref 135–145)
THYROGLOB AB FLD IA-ACNC: <20 IU/ML — SIGNIFICANT CHANGE UP (ref 0–40)
THYROGLOB AB SERPL-ACNC: <20 IU/ML — SIGNIFICANT CHANGE UP (ref 0–40)
THYROPEROXIDASE AB SERPL-ACNC: <10 IU/ML — SIGNIFICANT CHANGE UP (ref 0–34.9)
WBC # BLD: 5.2 K/UL — SIGNIFICANT CHANGE UP (ref 4.8–10.8)
WBC # FLD AUTO: 5.2 K/UL — SIGNIFICANT CHANGE UP (ref 4.8–10.8)

## 2018-12-03 PROCEDURE — 70553 MRI BRAIN STEM W/O & W/DYE: CPT | Mod: 26

## 2018-12-03 PROCEDURE — 99232 SBSQ HOSP IP/OBS MODERATE 35: CPT | Mod: GC

## 2018-12-03 PROCEDURE — 95819 EEG AWAKE AND ASLEEP: CPT | Mod: 26

## 2018-12-03 PROCEDURE — 99233 SBSQ HOSP IP/OBS HIGH 50: CPT | Mod: GC

## 2018-12-03 RX ORDER — METOPROLOL TARTRATE 50 MG
50 TABLET ORAL
Qty: 0 | Refills: 0 | Status: DISCONTINUED | OUTPATIENT
Start: 2018-12-03 | End: 2018-12-04

## 2018-12-03 RX ADMIN — Medication 2 MILLIGRAM(S): at 16:29

## 2018-12-03 RX ADMIN — Medication 25 MILLIGRAM(S): at 05:03

## 2018-12-03 RX ADMIN — Medication 50 MILLIGRAM(S): at 17:10

## 2018-12-03 NOTE — CHART NOTE - NSCHARTNOTEFT_GEN_A_CORE
EEG  12/3/18  Specific features: No focal, lateralizing, or epileptiform activity was present. There was significant movement artifact during hyperventilation and photic stimulation.   In addition the patient had episode described as shaking toward the end of the record. The episodes had no epileptic correlate but did consist of significant movement artifact.   Impression: This is a normal record. The episodes of shaking and movement had no epileptic correlate.       -EEG READ WITH NO ACTIVE SEIZURE NOTED DURING AN EPISODE.  CLEARED BY NEUROLOGY TO PROCEED WITH SURGICAL INTERVENTION

## 2018-12-03 NOTE — PROGRESS NOTE ADULT - ASSESSMENT
33 year old male admitted to medicine service for atrial flutter. Pt also complains of multiple episodes of epigastric pain, abdomen soft and slightly TTP in RUQ - pain initially was following meals with an RUQ US showing sludge, elevated CBD, and cholelithiasis and elevated AST and ALT now s/p ERCP with Tbili elevated. Neuro EEG pending this morning - unable to be completed over the weekend.    PLAN:  - Patient NPO with IVF for lap ba 12/3 if EEG completed or if Neurology feels he can go to the OR without EEG completed. 33 year old male admitted to medicine service for atrial flutter. Pt also complains of multiple episodes of epigastric pain, abdomen soft and slightly TTP in RUQ - pain initially was following meals with an RUQ US showing sludge, elevated CBD, and cholelithiasis and elevated AST and ALT now s/p ERCP with Tbili elevated. Neuro EEG pending this morning - unable to be completed over the weekend.    PLAN:  - Patient NPO with IVF for lap ba 12/3 if EEG completed or if Neurology feels he can go to the OR without EEG completed.  - f/u Neurology recs and clearance for OR hopefully today

## 2018-12-03 NOTE — PROGRESS NOTE ADULT - ASSESSMENT
32 y/o male with PMH of Graves hyperthyroidism and Atrial Flutter, non-compliant with medication, presented with acute onset chest pain and found to be in Aflutter w/ RVR in the ED. Now rate controlled and chest pain has since resolved. Also found to have cholelithiasis w/o cholecystitis s/p ERCP with biliary sphincterotomy and removal of sludge and stone on 11/30. During his hospital course, patients family informed team of focal twitching. Per patient, he gets episodes where he gets stiff, twitches and can't verbalize. Neuro brought on board and eeg/mri to be done     Atrial Fibrillation/flutter with RVR  - Etiology secondary due to hyperthyroidism and non-compliance with meds  -improved rate   - c/w metoprolol tartrate 25 mg q8h; per cardio - titrate up to 50mg q8h however, bp trends are lower and will maintain this dose for now   - chadsvasc 0, no AC or aspirin indicated  - per cardio will defer cardioversion or ablation until euthyroid state is achieved as recurrence is high  - beta blocker for now, uptitrate massimo and post op as needed  - seen by Cardio ([facility]) and consult appreciated   -d/c asa     Graves disease with mild hyperthyroidism  - methimazole 5mg resumed   - no PTU due to elevated lfts  - may need future therapy with MARTINES or surgery - can be discussed more outpt  - Endocrine consulted    Biliary Colic/Cholelithiasis without Cholecystitis with elevated lfts   - Sonographic Williamson negative, US noted for biliary sludge  - lap ba rescheduled to 12/3 due to pending EEG and MRI; will keep npo at midnight for surgery tomorrow  - ERCP performed 11/30 - removal of stone and biliary sludge with sphincterotomy  - LFTs and Tbili trending down   -surgery team not comfortable with doing surgical intervention today as planned due to seizure w/up     Seizure activity  - questionable seizure-like activities s/p ERCP  - possible metabolic etiology, but cannot rule out embolus given Afib/Aflutter  - monitor electrolytes, EEG and MRI head pending  - ativan prn   - seizure precautions, Neuro consulted    Preventive Measure   VCD boots 32 y/o male with PMH of Graves hyperthyroidism and Atrial Flutter, non-compliant with medication, presented with acute onset chest pain and found to be in Aflutter w/ RVR in the ED. Now rate controlled and chest pain has since resolved. Also found to have cholelithiasis w/o cholecystitis s/p ERCP with biliary sphincterotomy and removal of sludge and stone on 11/30. During his hospital course, patients family informed team of focal twitching. Per patient, he gets episodes where he gets stiff, twitches and can't verbalize. Pt was visualized having Involuntary movements/tremors/ possible occult seizure like activity at 5PM. MRI negative. Neuro on board to follow up with EEG and give clearance for lap ba.     Atrial Fibrillation/flutter with RVR  - Etiology secondary due to hyperthyroidism and non-compliance with meds  - improved rate   - c/w metoprolol tartrate 25 mg q8h; per cardio - titrate up to 50mg q8h however, bp trends are lower and will maintain this dose for now   - chadsvasc 0, no AC or aspirin indicated  - per cardio will defer cardioversion or ablation until euthyroid state is achieved as recurrence is high  - beta blocker for now, uptitrate massimo and post op as needed  - seen by Cardio ([facility]) and consult appreciated   -d/c asa     Graves disease with mild hyperthyroidism  - methimazole 5mg resumed   - no PTU due to elevated lfts  - may need future therapy with MARTINES or surgery - can be discussed more outpt  - Endocrine consulted    Biliary Colic/Cholelithiasis without Cholecystitis with elevated lfts   - Sonographic Williamson negative, US noted for biliary sludge  - lap ba rescheduled to 12/4 due to pending EEG and neuro clearance; will keep npo at midnight for surgery tomorrow  - ERCP performed 11/30 - removal of stone and biliary sludge with sphincterotomy  - LFTs and Tbili trending down   -surgery team not comfortable with doing surgical intervention today as planned due to seizure w/up  -Scheduled for sx in AM.    Seizure activity  - questionable seizure-like activities s/p ERCP and 12/3.  - Involuntary movements/tremors/ possible occult seizures at 5pm. S/p 1mg Ativan.  - possible metabolic etiology, but cannot rule out embolus given Afib/Aflutter  - MR head negative  - F/u EEG and neurology recommendations.  - monitor electrolytes  - ativan prn   - seizure precautions, Neuro consult pending    Preventive Measure   VCD boots 32 y/o male with PMH of Graves hyperthyroidism and Atrial Flutter, non-compliant with medication, presented with acute onset chest pain and found to be in Aflutter w/ RVR in the ED. Now rate controlled and chest pain has since resolved. Also found to have cholelithiasis w/o cholecystitis s/p ERCP with biliary sphincterotomy and removal of sludge and stone on 11/30. During his hospital course, patients family informed team of focal twitching. Per patient, he gets episodes where he gets stiff, twitches and can't verbalize. Pt was visualized having Involuntary movements/tremors/ possible occult seizure like activity at 5PM. MRI negative. Neuro on board to follow up with EEG and give clearance for lap ba.     Atrial Fibrillation/flutter with RVR- not well controlled with intermittent uncontrolled rate   - Etiology secondary due to hyperthyroidism and non-compliance with meds  -will increase metoprolol to 50mg q8h  - chadsvasc 0, no AC or aspirin indicated  - per cardio will defer cardioversion or ablation until euthyroid state is achieved as recurrence is high  - beta blocker for now, uptitrate massimo and post op as needed  - seen by Cardio ([facility]) and consult appreciated     Graves disease with mild hyperthyroidism  - methimazole 5mg resumed   - no PTU due to elevated lfts  - may need future therapy with MARTINES or surgery - can be discussed more outpt  - Endocrine consulted    Biliary Colic/Cholelithiasis without Cholecystitis with elevated lfts   - Sonographic Williamson negative, US noted for biliary sludge  - lap ba rescheduled to 12/4 due to pending EEG and neuro clearance; will keep npo at midnight for surgery tomorrow  - ERCP performed 11/30 - removal of stone and biliary sludge with sphincterotomy  - LFTs and Tbili trending down   -Scheduled for sx in AM.    Seizure activity  - questionable seizure-like activities s/p ERCP and 12/3. occurred today during EEG, awaiting read   - Involuntary movements/tremors/ possible occult seizures at 5pm. S/p 1mg Ativan.  - possible metabolic etiology, but cannot rule out embolus given Afib/Aflutter  - MR head negative  - F/u EEG and neurology recommendations.  - ativan prn     Preventive Measure   VCD boots 32 y/o male with PMH of Graves hyperthyroidism and Atrial Flutter, non-compliant with medication, presented with acute onset chest pain and found to be in Aflutter w/ RVR in the ED. Now rate controlled and chest pain has since resolved. Also found to have cholelithiasis w/o cholecystitis s/p ERCP with biliary sphincterotomy and removal of sludge and stone on 11/30. During his hospital course, patients family informed team of focal twitching. Per patient, he gets episodes where he gets stiff, twitches and can't verbalize. Pt was visualized having Involuntary movements/tremors/ possible occult seizure like activity at 5PM. MRI negative. Neuro on board to follow up with EEG and give clearance for lap ba.     12/3/18 EEG READ, SHOWING NO ACTIVE SEIZURE DURING AN EPISODE.   CLEARED BY NEUROLOGY OT PROCEED WITH SURGICAL INTERVENTION.          Atrial Fibrillation/flutter with RVR- not well controlled with intermittent uncontrolled rate   - Etiology secondary due to hyperthyroidism and non-compliance with meds  -will increase metoprolol to 50mg q8h  - chadsvasc 0, no AC or aspirin indicated  - per cardio will defer cardioversion or ablation until euthyroid state is achieved as recurrence is high  - beta blocker for now, uptitrate massimo and post op as needed  - seen by Cardio ([facility]) and consult appreciated     Graves disease with mild hyperthyroidism  - methimazole 5mg resumed   - no PTU due to elevated lfts  - may need future therapy with MARTINES or surgery - can be discussed more outpt  - Endocrine consulted    Biliary Colic/Cholelithiasis without Cholecystitis with elevated lfts   - Sonographic Williamson negative, US noted for biliary sludge  - lap ba rescheduled to 12/4 due to pending EEG and neuro clearance; will keep npo at midnight for surgery tomorrow  - ERCP performed 11/30 - removal of stone and biliary sludge with sphincterotomy  - LFTs and Tbili trending down   -Scheduled for sx in AM.    Seizure activity  -EEG READ WITH NO ACTIVE SEIZURE NOTED DURING AN EPISODE.  CLEARED BY NEUROLOGY TO PROCEED WITH SURGICAL INTERVENTION     -The episodes of shaking and movement had no epileptic correlate on EEG;     - questionable seizure-like activities s/p ERCP and 12/3.occurred today during EEG,   - Involuntary movements/tremors/ possible occult seizures at 5pm. S/p 1mg Ativan.  - possible metabolic etiology, but cannot rule out embolus given Afib/Aflutter  - MR head negative  - ativan prn     Preventive Measure   VCD boots

## 2018-12-03 NOTE — PROGRESS NOTE ADULT - SUBJECTIVE AND OBJECTIVE BOX
INTERVAL HPI/OVERNIGHT EVENTS:    No acute overnight events.    In the interval the following consultants have commented:    Neuro rec'd trend Mg, Ca, EEG - NO EEGs performed over the weekend - team will f/u later this morning  Endo - Cleared for OR for lap ba; rec'd controlling mild hyperthyroidism with methimazole; Cards - no indication for anticoagulation, OK for OR  GI - signed off  ****Likely OR for lap ba later today - will f/u with Primary Hospitalist    SUBJECTIVE: Patient feeling well this morning; slept fairly well. Continues to have mild abdominal pain. Denies fever, chills, nausea, vomiting ON.      MEDICATIONS  (STANDING):  indomethacin Suppository 100 milliGRAM(s) Rectal once  methimazole 5 milliGRAM(s) Oral three times a day  metoprolol tartrate 25 milliGRAM(s) Oral every 8 hours    MEDICATIONS  (PRN):  LORazepam   Injectable 2 milliGRAM(s) IV Push once PRN seizures  naproxen 375 milliGRAM(s) Oral every 12 hours PRN Mild Pain (1 - 3)  oxyCODONE    IR 5 milliGRAM(s) Oral every 8 hours PRN Severe Pain (7 - 10)      Vital Signs Last 24 Hrs  T(C): 36.7 (02 Dec 2018 21:18), Max: 36.7 (02 Dec 2018 21:18)  T(F): 98.1 (02 Dec 2018 21:18), Max: 98.1 (02 Dec 2018 21:18)  HR: 79 (02 Dec 2018 21:18) (79 - 83)  BP: 102/58 (02 Dec 2018 21:18) (102/58 - 111/74)  BP(mean): --  RR: 16 (02 Dec 2018 21:18) (16 - 19)  SpO2: 99% (02 Dec 2018 21:18) (96% - 100%)    PHYSICAL EXAM:  General: No acute distress; lying comfortably in bed   Respiratory: Nonlabored  Cardiovascular: S1/S2  Abdominal: Soft, mild epigastric tenderness. No guarding or rebound.   Extremities: Warm  Vascular: Radial Pulse 2+, bilaterally  Neuro: A&OX4      I&O's Detail    01 Dec 2018 07:01  -  02 Dec 2018 07:00  --------------------------------------------------------  IN:    lactated ringers.: 1000 mL    Oral Fluid: 50 mL  Total IN: 1050 mL    OUT:  Total OUT: 0 mL    Total NET: 1050 mL      LABS:                        13.3   4.4   )-----------( 227      ( 02 Dec 2018 06:00 )             42.0     12-02    140  |  101  |  9.0  ----------------------------<  94  3.7   |  27.0  |  0.63    Ca    9.1      02 Dec 2018 06:00  Phos  3.8     12-02  Mg     2.0     12-02    TPro  6.3<L>  /  Alb  3.7  /  TBili  1.6  /  DBili  x   /  AST  44<H>  /  ALT  231<H>  /  AlkPhos  111  12-02          RADIOLOGY & ADDITIONAL STUDIES: INTERVAL HPI/OVERNIGHT EVENTS:    No acute overnight events.    In the interval the following consultants have commented:    Neuro rec'd trend Mg, Ca, EEG - NO EEGs performed over the weekend - team will f/u later this morning  Endo - Cleared for OR for lap ba; rec'd controlling mild hyperthyroidism with methimazole; Cards - no indication for anticoagulation, OK for OR  GI - signed off    Initial plan for patient to go to OR for lap ba today, however patient can't be cleared without receiving an EEG    SUBJECTIVE: Patient feeling well this morning; slept fairly well. Continues to have mild abdominal pain. Denies fever, chills, nausea, vomiting ON.      MEDICATIONS  (STANDING):  indomethacin Suppository 100 milliGRAM(s) Rectal once  methimazole 5 milliGRAM(s) Oral three times a day  metoprolol tartrate 25 milliGRAM(s) Oral every 8 hours    MEDICATIONS  (PRN):  LORazepam   Injectable 2 milliGRAM(s) IV Push once PRN seizures  naproxen 375 milliGRAM(s) Oral every 12 hours PRN Mild Pain (1 - 3)  oxyCODONE    IR 5 milliGRAM(s) Oral every 8 hours PRN Severe Pain (7 - 10)      Vital Signs Last 24 Hrs  T(C): 36.7 (02 Dec 2018 21:18), Max: 36.7 (02 Dec 2018 21:18)  T(F): 98.1 (02 Dec 2018 21:18), Max: 98.1 (02 Dec 2018 21:18)  HR: 79 (02 Dec 2018 21:18) (79 - 83)  BP: 102/58 (02 Dec 2018 21:18) (102/58 - 111/74)  BP(mean): --  RR: 16 (02 Dec 2018 21:18) (16 - 19)  SpO2: 99% (02 Dec 2018 21:18) (96% - 100%)    PHYSICAL EXAM:  General: No acute distress; lying comfortably in bed   Respiratory: Nonlabored  Cardiovascular: S1/S2  Abdominal: Soft, mild epigastric tenderness. No guarding or rebound.   Extremities: Warm  Vascular: Radial Pulse 2+, bilaterally  Neuro: A&OX4      I&O's Detail    01 Dec 2018 07:01  -  02 Dec 2018 07:00  --------------------------------------------------------  IN:    lactated ringers.: 1000 mL    Oral Fluid: 50 mL  Total IN: 1050 mL    OUT:  Total OUT: 0 mL    Total NET: 1050 mL      LABS:                        13.3   4.4   )-----------( 227      ( 02 Dec 2018 06:00 )             42.0     12-02    140  |  101  |  9.0  ----------------------------<  94  3.7   |  27.0  |  0.63    Ca    9.1      02 Dec 2018 06:00  Phos  3.8     12-02  Mg     2.0     12-02    TPro  6.3<L>  /  Alb  3.7  /  TBili  1.6  /  DBili  x   /  AST  44<H>  /  ALT  231<H>  /  AlkPhos  111  12-02          RADIOLOGY & ADDITIONAL STUDIES:

## 2018-12-03 NOTE — PROGRESS NOTE ADULT - SUBJECTIVE AND OBJECTIVE BOX
[facility]                                          [address] , [address], [place] [address]                                               PHONE: [phone]    FAX: [phone] [address] [facility], [address], [place] [address]                                       PHONE: [phone]    FAX: [phone]  *******************************************************************************    Overnight events/Subjective Assessment: neuro work up pending.  no CP, palpitations, syncope, or SOB    INTERPRETATION OF TELEMETRY (personally reviewed): AFlutter with variable conduction 70s-160s, PVCs vs aberrant beats    No Known Allergies    MEDICATIONS  (STANDING):  ertapenem  IVPB 1000 milliGRAM(s) IV Intermittent once  indomethacin Suppository 100 milliGRAM(s) Rectal once  lactated ringers. 1000 milliLiter(s) (125 mL/Hr) IV Continuous <Continuous>  methimazole 5 milliGRAM(s) Oral three times a day  metoprolol tartrate 25 milliGRAM(s) Oral every 8 hours    MEDICATIONS  (PRN):  LORazepam   Injectable 2 milliGRAM(s) IV Push once PRN seizures  naproxen 375 milliGRAM(s) Oral every 12 hours PRN Mild Pain (1 - 3)  oxyCODONE    IR 5 milliGRAM(s) Oral every 8 hours PRN Severe Pain (7 - 10)      Vital Signs Last 24 Hrs  T(C): 37.1 (03 Dec 2018 04:55), Max: 37.1 (03 Dec 2018 04:55)  T(F): 98.7 (03 Dec 2018 04:55), Max: 98.7 (03 Dec 2018 04:55)  HR: 72 (03 Dec 2018 04:55) (72 - 80)  BP: 112/66 (03 Dec 2018 04:55) (102/58 - 112/66)  RR: 14 (03 Dec 2018 04:55) (14 - 19)  SpO2: 95% (03 Dec 2018 04:55) (95% - 99%)    PHYSICAL EXAM:  General: Appears well developed, well nourished, no acute distress  HEENT: Head: normocephalic, atraumatic  Eyes: Pupils equal and reactive  Neck: Supple, no carotid bruit, no JVD, no HJR  CARDIOVASCULAR: Irregularly irregular; normal S1 and S2, no murmur, rub, or gallop  LUNGS: Clear to auscultation bilaterally, no rales, rhonchi or wheeze  ABDOMEN: Soft, nontender, non-distended, positive bowel sounds, no mass or bruit  EXTREMITIES: No edema, distal pulses WNL  SKIN: Warm and dry with normal turgor  NEURO: Alert & oriented x 3, grossly intact  PSYCH: normal mood and affect        LABS:                        13.3   4.4   )-----------( 227      ( 02 Dec 2018 06:00 )             42.0     12-02    140  |  101  |  9.0  ----------------------------<  94  3.7   |  27.0  |  0.63    Ca    9.1      02 Dec 2018 06:00  Phos  3.8     12-02  Mg     2.0     12-02    TPro  6.3<L>  /  Alb  3.7  /  TBili  1.6  /  DBili  x   /  AST  44<H>  /  ALT  231<H>  /  AlkPhos  111  12-02          serum  Lipids:   Hemoglobin A1C, Whole Blood: 5.0 % (11-29 @ 14:08)    Thyroid Stimulating Hormone, Serum: <0.10 uIU/mL (11-29 @ 07:39)      ECHO:  < from: TTE Echo Complete w/Doppler (11.29.18 @ 10:05) >  PHYSICIAN INTERPRETATION:  Left Ventricle: The left ventricular internal cavity size is mildly   increased.  Global LV systolic function was Low normal. Left ventricular ejection   fraction, by visual estimation, is 50 to 55%. The mitral in-flow pattern   reveals no discernable A-wave, therefore no comment on diastolic function   can be made.  Right Ventricle: Normal right ventricular size andfunction.  Left Atrium: Mildly enlarged left atrium.  Right Atrium: Mildly enlarged right atrium.  Pericardium: There is no evidence of pericardial effusion.  Mitral Valve: Thickening of the anterior and posterior mitral valve   leaflets. Mild to moderate mitral valve regurgitation is seen.  Tricuspid Valve: The tricuspid valve is normal. Mild tricuspid   regurgitation is visualized. Estimated pulmonary artery systolic pressure   is 39.6 mmHg assuming a right atrial pressure of 15 mmHg, which is   consistent with borderline pulmonary hypertension.  Aortic Valve: Normal trileaflet aortic valve with normal opening. The   aortic valve is trileaflet. Trivial aortic valve regurgitation is seen.  Pulmonic Valve: Structurally normal pulmonic valve, with normal leaflet   excursion. Mild pulmonic valve regurgitation.  Aorta: The aortic root and ascending aorta are structurally normal, with   no evidence of dilitation.  Pulmonary Artery: The main pulmonary artery is normal in size.  Venous: The inferior vena cava was dilated, with respiratory size   variation less than 50%.       Summary:   1. Left ventricular ejection fraction, by visual estimation, is 50 to 55%.   2. Low normal global left ventricular systolic function.   3. The mitral in-flow pattern reveals no discernable A-wave, therefore   no comment on diastolic function can be made.   4. There is no evidence of pericardial effusion.   5. Thickening of the anterior and posterior mitral valve leaflets.   6. Mild tricuspid regurgitation.   7. Pulmonic valve regurgitation.   8. Estimated pulmonary artery systolic pressure is 39.6 mmHg assuming a   right atrial pressure of 15 mmHg, which is consistent with borderline   pulmonary hypertension.    ASSESSMENT AND PLAN:  33y Male with past medical history significant for hyperthyroidism (pt self discontinued methimazole), remote paroxysmal AF a/w abdominal pain and new onset atrial flutter/fibrillation in the setting of hyperthyroid.     - Unfortunately pt self-discontinued methimazole last year because of perceived side effects.  Now seen by endocrine and methimazole restarted  - TSH < 0.1.  Untreated hyperthyroidism is most certainly contributing to his AFlutter/fib, which is rapid at times  - Now s/p EUS showing choledocholithiasis and ERCP for biliary sphincterotomy and stone/sludge extraction  - post-procedure had possible seizure-like activity. Primary team to address.  Neuro work up underway  - Abx in place  - No indication for ASA  - Increase Lopressor 100mg to bid with IVP Lopressor prn  - His HR trends to the 70s, but will inevitably increase with exertion.  He is young and can tolerate this in the short-term.  Expect this to improve with management of hyperthyroid state  - No indication for OAC as his CHADSVASc score is 0.  - Hopefully he returns to SR with management of hyperthyroid state.  Will defer any attempt at restoration of NSR (i.e DCCV or ablation) until he is euthyroid as recurrence is high otherwise.  Should DCCV be pursued, he will need to be on AC for at least 1 month following cardioversion.  This will interfere with plan for surgery.  - There are no cardiac contraindications to cholecystectomy which is on hold until neuro work up is completed.  Perioperatively would expect tachycardia, which can be managed with BB therapy. [facility]                                          [address] , [address], [place] [address]                                               PHONE: [phone]    FAX: [phone] [address] [facility], [address], [place] [address]                                       PHONE: [phone]    FAX: [phone]  *******************************************************************************    Overnight events/Subjective Assessment: neuro work up pending.  no CP, palpitations, syncope, or SOB    INTERPRETATION OF TELEMETRY (personally reviewed): AFlutter with variable conduction 70s-160s, PVCs vs aberrant beats    No Known Allergies    MEDICATIONS  (STANDING):  ertapenem  IVPB 1000 milliGRAM(s) IV Intermittent once  indomethacin Suppository 100 milliGRAM(s) Rectal once  lactated ringers. 1000 milliLiter(s) (125 mL/Hr) IV Continuous <Continuous>  methimazole 5 milliGRAM(s) Oral three times a day  metoprolol tartrate 25 milliGRAM(s) Oral every 8 hours    MEDICATIONS  (PRN):  LORazepam   Injectable 2 milliGRAM(s) IV Push once PRN seizures  naproxen 375 milliGRAM(s) Oral every 12 hours PRN Mild Pain (1 - 3)  oxyCODONE    IR 5 milliGRAM(s) Oral every 8 hours PRN Severe Pain (7 - 10)      Vital Signs Last 24 Hrs  T(C): 37.1 (03 Dec 2018 04:55), Max: 37.1 (03 Dec 2018 04:55)  T(F): 98.7 (03 Dec 2018 04:55), Max: 98.7 (03 Dec 2018 04:55)  HR: 72 (03 Dec 2018 04:55) (72 - 80)  BP: 112/66 (03 Dec 2018 04:55) (102/58 - 112/66)  RR: 14 (03 Dec 2018 04:55) (14 - 19)  SpO2: 95% (03 Dec 2018 04:55) (95% - 99%)    PHYSICAL EXAM:  General: Appears well developed, well nourished, no acute distress  HEENT: Head: normocephalic, atraumatic  Eyes: Pupils equal and reactive  Neck: Supple, no carotid bruit, no JVD, no HJR  CARDIOVASCULAR: Irregularly irregular; normal S1 and S2, no murmur, rub, or gallop  LUNGS: Clear to auscultation bilaterally, no rales, rhonchi or wheeze  ABDOMEN: Soft, nontender, non-distended, positive bowel sounds, no mass or bruit  EXTREMITIES: No edema, distal pulses WNL  SKIN: Warm and dry with normal turgor  NEURO: Alert & oriented x 3, grossly intact  PSYCH: normal mood and affect        LABS:                        13.3   4.4   )-----------( 227      ( 02 Dec 2018 06:00 )             42.0     12-02    140  |  101  |  9.0  ----------------------------<  94  3.7   |  27.0  |  0.63    Ca    9.1      02 Dec 2018 06:00  Phos  3.8     12-02  Mg     2.0     12-02    TPro  6.3<L>  /  Alb  3.7  /  TBili  1.6  /  DBili  x   /  AST  44<H>  /  ALT  231<H>  /  AlkPhos  111  12-02          serum  Lipids:   Hemoglobin A1C, Whole Blood: 5.0 % (11-29 @ 14:08)    Thyroid Stimulating Hormone, Serum: <0.10 uIU/mL (11-29 @ 07:39)      ECHO:  < from: TTE Echo Complete w/Doppler (11.29.18 @ 10:05) >  PHYSICIAN INTERPRETATION:  Left Ventricle: The left ventricular internal cavity size is mildly   increased.  Global LV systolic function was Low normal. Left ventricular ejection   fraction, by visual estimation, is 50 to 55%. The mitral in-flow pattern   reveals no discernable A-wave, therefore no comment on diastolic function   can be made.  Right Ventricle: Normal right ventricular size andfunction.  Left Atrium: Mildly enlarged left atrium.  Right Atrium: Mildly enlarged right atrium.  Pericardium: There is no evidence of pericardial effusion.  Mitral Valve: Thickening of the anterior and posterior mitral valve   leaflets. Mild to moderate mitral valve regurgitation is seen.  Tricuspid Valve: The tricuspid valve is normal. Mild tricuspid   regurgitation is visualized. Estimated pulmonary artery systolic pressure   is 39.6 mmHg assuming a right atrial pressure of 15 mmHg, which is   consistent with borderline pulmonary hypertension.  Aortic Valve: Normal trileaflet aortic valve with normal opening. The   aortic valve is trileaflet. Trivial aortic valve regurgitation is seen.  Pulmonic Valve: Structurally normal pulmonic valve, with normal leaflet   excursion. Mild pulmonic valve regurgitation.  Aorta: The aortic root and ascending aorta are structurally normal, with   no evidence of dilitation.  Pulmonary Artery: The main pulmonary artery is normal in size.  Venous: The inferior vena cava was dilated, with respiratory size   variation less than 50%.       Summary:   1. Left ventricular ejection fraction, by visual estimation, is 50 to 55%.   2. Low normal global left ventricular systolic function.   3. The mitral in-flow pattern reveals no discernable A-wave, therefore   no comment on diastolic function can be made.   4. There is no evidence of pericardial effusion.   5. Thickening of the anterior and posterior mitral valve leaflets.   6. Mild tricuspid regurgitation.   7. Pulmonic valve regurgitation.   8. Estimated pulmonary artery systolic pressure is 39.6 mmHg assuming a   right atrial pressure of 15 mmHg, which is consistent with borderline   pulmonary hypertension.    ASSESSMENT AND PLAN:  33y Male with past medical history significant for hyperthyroidism (pt self discontinued methimazole), remote paroxysmal AF a/w abdominal pain and new onset atrial flutter/fibrillation in the setting of hyperthyroid.     - Unfortunately pt self-discontinued methimazole last year because of perceived side effects.  Now seen by endocrine and methimazole restarted  - TSH < 0.1.  Untreated hyperthyroidism is most certainly contributing to his AFlutter/fib, which is rapid at times  - Now s/p EUS showing choledocholithiasis and ERCP for biliary sphincterotomy and stone/sludge extraction  - post-procedure had possible seizure-like activity. Primary team to address.  Neuro work up underway  - Abx in place  - No indication for ASA  - Persistent afib/flutter with intermittently elevated HR: will increase metoprolol to 50 mg bid and continue to titrate as needed.   - His HR trends to the 70s, but will inevitably increase with exertion.  He is young and can tolerate this in the short-term.  Expect this to improve with management of hyperthyroid state  - No indication for OAC as his CHADSVASc score is 0.  - Hopefully he returns to SR with management of hyperthyroid state.  Will defer any attempt at restoration of NSR (i.e DCCV or ablation) until he is euthyroid as recurrence is high otherwise.  Should DCCV be pursued, he will need to be on AC for at least 1 month following cardioversion.  This will interfere with plan for surgery.  - There are no cardiac contraindications to cholecystectomy which is on hold until neuro work up is completed.  Perioperatively would expect tachycardia, which can be managed with BB therapy.

## 2018-12-03 NOTE — CHART NOTE - NSCHARTNOTEFT_GEN_A_CORE
Called to see patient with  atrial flutter  Having EEG and partial seizure during episode  Awake alert now  Neck supple  Lungs clear  Heart s1&s2 regular  Abd soft non tender  Ext no c/c/e  Neuro alert     a/p  1.Atrial flutter with rate 150  b/p 128/70  Cardizem 10mg ivp  will continue to montior  2.  Hyperthyroidism - methmazole  3.  Partial seizure  occurred with EEG  to be read by neurology

## 2018-12-03 NOTE — PROGRESS NOTE ADULT - SUBJECTIVE AND OBJECTIVE BOX
cc: epigastric pain.     Overnight/AM Events:  Patient seen and examined this morning. Pt has been npo since midnight for lap ba today. He understands he needs neurology clearance after EEG and MRI head are completed before hand. This morning at bedside patient denies chest pain, palpitations, sob, dizziness. He is ambulating and voiding without difficulties.     ROS: No chest pain, shortness of breath, nausea/vomiting, lightheadedness, LE edema or other symptoms. All other ROS negative     Cardiac monitor: A-flutter, episode of tachycardia with hr 150s with exertion and self resolves to the 70-80s seconds later.    Vital Signs Last 24 Hrs  T(C): 37.1 (03 Dec 2018 04:55), Max: 37.1 (03 Dec 2018 04:55)  T(F): 98.7 (03 Dec 2018 04:55), Max: 98.7 (03 Dec 2018 04:55)  HR: 72 (03 Dec 2018 04:55) (72 - 80)  BP: 112/66 (03 Dec 2018 04:55) (102/58 - 112/66)  RR: 14 (03 Dec 2018 04:55) (14 - 19)  SpO2: 95% (03 Dec 2018 04:55) (95% - 99%)    Physical exam:  Gen: Adult  male, NAD, comfortable  Head: NC/AT  Eyes: EOMI, Perrla 3mm b/l  Throat: moist mucous membranes, tonsils normal  Pulm: CTAB, no wheezes or crackles  Cardio: RRR, normal +S1/S2, no murmurs  GI: +BS, soft, non distended, non-tender, no guarding  Extremities: no pitting edema, no calf tenderness. 2+ radial and dorsalis pedal pulses  Neuro: alert and oriented, no gross sensory or motor deficits.   Psych: Normal speech and affect, good eye contact.                          14.2   5.2   )-----------( 249      ( 03 Dec 2018 05:58 )             43.2     12-03    140  |  102  |  11.0  ----------------------------<  108  3.7   |  26.0  |  0.65    Ca    9.3      03 Dec 2018 05:58  Phos  3.8     12-02  Mg     2.0     12-02    TPro  6.3<L>  /  Alb  3.7  /  TBili  1.6  /  DBili  x   /  AST  44<H>  /  ALT  231<H>  /  AlkPhos  111  12-02      MEDICATIONS  (STANDING):  indomethacin Suppository 100 milliGRAM(s) Rectal once  methimazole 5 milliGRAM(s) Oral three times a day  metoprolol tartrate 50 milliGRAM(s) Oral two times a day    MEDICATIONS  (PRN):  LORazepam   Injectable 2 milliGRAM(s) IV Push once PRN seizures  naproxen 375 milliGRAM(s) Oral every 12 hours PRN Mild Pain (1 - 3)  oxyCODONE    IR 5 milliGRAM(s) Oral every 8 hours PRN Severe Pain (7 - 10) cc: epigastric pain.     Overnight/AM Events:  Patient seen and examined this morning. Pt was npo last night for lap ba today but it was cancelled due to need for neurology clearance after EEG. This morning at bedside patient denies chest pain, palpitations, sob, dizziness. He is ambulating and voiding without difficulties.     ROS: No chest pain, shortness of breath, nausea/vomiting, lightheadedness, LE edema or other symptoms. All other ROS negative     Cardiac monitor: A-flutter, episode of tachycardia with hr 150s with exertion and self resolves to the 70-80s seconds later.                          14.2   5.2   )-----------( 249      ( 03 Dec 2018 05:58 )             43.2     12-03    140  |  102  |  11.0  ----------------------------<  108  3.7   |  26.0  |  0.65    Ca    9.3      03 Dec 2018 05:58  Phos  3.8     12-02  Mg     2.0     12-02    TPro  6.3<L>  /  Alb  3.7  /  TBili  1.6  /  DBili  x   /  AST  44<H>  /  ALT  231<H>  /  AlkPhos  111  12-02      Physical exam:  Gen: Adult  male, NAD, comfortable  Head: NC/AT  Eyes: EOMI, Perrla 3mm b/l  Throat: moist mucous membranes, tonsils normal  Pulm: CTAB, no wheezes or crackles  Cardio: RRR, normal +S1/S2, no murmurs  GI: +BS, soft, non distended, non-tender, no guarding  Extremities: no pitting edema, no calf tenderness. 2+ radial and dorsalis pedal pulses  Neuro: alert and oriented, no gross sensory or motor deficits.   Psych: Normal speech and affect, good eye contact.                          14.2   5.2   )-----------( 249      ( 03 Dec 2018 05:58 )             43.2     12-03    140  |  102  |  11.0  ----------------------------<  108  3.7   |  26.0  |  0.65    Ca    9.3      03 Dec 2018 05:58  Phos  3.8     12-02  Mg     2.0     12-02    TPro  6.3<L>  /  Alb  3.7  /  TBili  1.6  /  DBili  x   /  AST  44<H>  /  ALT  231<H>  /  AlkPhos  111  12-02      MEDICATIONS  (STANDING):  indomethacin Suppository 100 milliGRAM(s) Rectal once  methimazole 5 milliGRAM(s) Oral three times a day  metoprolol tartrate 50 milliGRAM(s) Oral two times a day    MEDICATIONS  (PRN):  naproxen 375 milliGRAM(s) Oral every 12 hours PRN Mild Pain (1 - 3)  oxyCODONE    IR 5 milliGRAM(s) Oral every 8 hours PRN Severe Pain (7 - 10) cc: epigastric pain.     Overnight/AM Events:  Patient seen and examined this morning. Pt was npo last night for lap ba today but it was cancelled due to need for neurology clearance after EEG. This morning at bedside patient denies chest pain, palpitations, sob, dizziness. He is ambulating and voiding without difficulties.     ROS: No chest pain, shortness of breath, nausea/vomiting, lightheadedness, LE edema or other symptoms. All other ROS negative     Cardiac monitor: A-flutter, episode of tachycardia with hr 150s with exertion and self resolves to the 70-80s seconds later.    Vital Signs Last 24 Hrs  T(C): 36.7 (03 Dec 2018 12:00), Max: 37.1 (03 Dec 2018 04:55)  T(F): 98 (03 Dec 2018 12:00), Max: 98.7 (03 Dec 2018 04:55)  HR: 114 (03 Dec 2018 16:27) (72 - 114)  BP: 128/62 (03 Dec 2018 16:27) (102/58 - 128/62)  RR: 16 (03 Dec 2018 16:27) (14 - 16)  SpO2: 96% (03 Dec 2018 16:27) (95% - 99%)      Physical exam:  Gen: Adult  male, NAD, comfortable  Head: NC/AT  Eyes: EOMI, Perrla 3mm b/l  Throat: moist mucous membranes, tonsils normal  Pulm: CTAB, no wheezes or crackles  Cardio: RRR, normal +S1/S2, no murmurs  GI: +BS, soft, non distended, non-tender, no guarding  Extremities: no pitting edema, no calf tenderness. 2+ radial and dorsalis pedal pulses  Neuro: alert and oriented, no gross sensory or motor deficits.   Psych: Normal speech and affect, good eye contact.    LABS:                         14.2   5.2   )-----------( 249      ( 03 Dec 2018 05:58 )             43.2     12-03    140  |  102  |  11.0  ----------------------------<  108  3.7   |  26.0  |  0.65    Ca    9.3      03 Dec 2018 05:58  Phos  3.8     12-02  Mg     2.0     12-02    TPro  6.3<L>  /  Alb  3.7  /  TBili  1.6  /  DBili  x   /  AST  44<H>  /  ALT  231<H>  /  AlkPhos  111  12-02      MEDICATIONS  (STANDING):  indomethacin Suppository 100 milliGRAM(s) Rectal once  methimazole 5 milliGRAM(s) Oral three times a day  metoprolol tartrate 50 milliGRAM(s) Oral two times a day    MEDICATIONS  (PRN):  naproxen 375 milliGRAM(s) Oral every 12 hours PRN Mild Pain (1 - 3)  oxyCODONE    IR 5 milliGRAM(s) Oral every 8 hours PRN Severe Pain (7 - 10)    Radiology:   < from: MR Head w/wo IV Cont (12.03.18 @ 11:07) >  IMPRESSION: No acute intracranial pathology. No abnormal enhancement.    < end of copied text >  EEG pending report cc: epigastric pain.     Overnight/AM Events:  Patient seen and examined this morning. Pt was npo last night for lap ba today but it was cancelled due to need for neurology clearance after EEG. This morning at bedside patient denies chest pain, palpitations, sob, dizziness. He is ambulating and voiding without difficulties.     ROS: No chest pain, shortness of breath, nausea/vomiting, lightheadedness, LE edema or other symptoms. All other ROS negative     Cardiac monitor: A-flutter, episode of tachycardia with hr 150s with exertion and self resolves to the 70-80s seconds later.    Vital Signs Last 24 Hrs  T(C): 36.7 (03 Dec 2018 12:00), Max: 37.1 (03 Dec 2018 04:55)  T(F): 98 (03 Dec 2018 12:00), Max: 98.7 (03 Dec 2018 04:55)  HR: 114 (03 Dec 2018 16:27) (72 - 114)  BP: 128/62 (03 Dec 2018 16:27) (102/58 - 128/62)  RR: 16 (03 Dec 2018 16:27) (14 - 16)  SpO2: 96% (03 Dec 2018 16:27) (95% - 99%)      Physical exam:  Gen: Adult  male, NAD, comfortable  Head: NC/AT  Eyes: EOMI, Perrla 3mm b/l  Throat: moist mucous membranes, tonsils normal  Pulm: CTAB, no wheezes or crackles  Cardio: RRR, normal +S1/S2, no murmurs  GI: +BS, soft, non distended, non-tender, no guarding  Extremities: no pitting edema, no calf tenderness. 2+ radial and dorsalis pedal pulses  Neuro: alert and oriented, no gross sensory or motor deficits.   Psych: Normal speech and affect, good eye contact.    LABS:                         14.2   5.2   )-----------( 249      ( 03 Dec 2018 05:58 )             43.2     12-03    140  |  102  |  11.0  ----------------------------<  108  3.7   |  26.0  |  0.65    Ca    9.3      03 Dec 2018 05:58  Phos  3.8     12-02  Mg     2.0     12-02    TPro  6.3<L>  /  Alb  3.7  /  TBili  1.6  /  DBili  x   /  AST  44<H>  /  ALT  231<H>  /  AlkPhos  111  12-02      MEDICATIONS  (STANDING):  indomethacin Suppository 100 milliGRAM(s) Rectal once  methimazole 5 milliGRAM(s) Oral three times a day  metoprolol tartrate 50 milliGRAM(s) Oral two times a day    MEDICATIONS  (PRN):  naproxen 375 milliGRAM(s) Oral every 12 hours PRN Mild Pain (1 - 3)  oxyCODONE    IR 5 milliGRAM(s) Oral every 8 hours PRN Severe Pain (7 - 10)    Radiology:   < from: MR Head w/wo IV Cont (12.03.18 @ 11:07) >  IMPRESSION: No acute intracranial pathology. No abnormal enhancement.    < end of copied text >            EEG  12/3/18    Specific features: No focal, lateralizing, or epileptiform activity was present. There was significant movement artifact during hyperventilation and photic stimulation.   In addition the patient had episode described as shaking toward the end of the record. The episodes had no epileptic correlate but did consist of significant movement artifact.     Impression: This is a normal record. The episodes of shaking and movement had no epileptic correlate.

## 2018-12-04 ENCOUNTER — RESULT REVIEW (OUTPATIENT)
Age: 33
End: 2018-12-04

## 2018-12-04 LAB
ALBUMIN SERPL ELPH-MCNC: 3.9 G/DL — SIGNIFICANT CHANGE UP (ref 3.3–5.2)
ALP SERPL-CCNC: 94 U/L — SIGNIFICANT CHANGE UP (ref 40–120)
ALT FLD-CCNC: 164 U/L — HIGH
ANION GAP SERPL CALC-SCNC: 11 MMOL/L — SIGNIFICANT CHANGE UP (ref 5–17)
AST SERPL-CCNC: 46 U/L — HIGH
BASOPHILS # BLD AUTO: 0 K/UL — SIGNIFICANT CHANGE UP (ref 0–0.2)
BASOPHILS NFR BLD AUTO: 0.2 % — SIGNIFICANT CHANGE UP (ref 0–2)
BILIRUB DIRECT SERPL-MCNC: 0.4 MG/DL — HIGH (ref 0–0.3)
BILIRUB INDIRECT FLD-MCNC: 0.6 MG/DL — SIGNIFICANT CHANGE UP (ref 0.2–1)
BILIRUB SERPL-MCNC: 1 MG/DL — SIGNIFICANT CHANGE UP (ref 0.4–2)
BUN SERPL-MCNC: 16 MG/DL — SIGNIFICANT CHANGE UP (ref 8–20)
CALCIUM SERPL-MCNC: 9.1 MG/DL — SIGNIFICANT CHANGE UP (ref 8.6–10.2)
CHLORIDE SERPL-SCNC: 104 MMOL/L — SIGNIFICANT CHANGE UP (ref 98–107)
CO2 SERPL-SCNC: 26 MMOL/L — SIGNIFICANT CHANGE UP (ref 22–29)
CREAT SERPL-MCNC: 0.71 MG/DL — SIGNIFICANT CHANGE UP (ref 0.5–1.3)
EOSINOPHIL # BLD AUTO: 0.2 K/UL — SIGNIFICANT CHANGE UP (ref 0–0.5)
EOSINOPHIL NFR BLD AUTO: 3.9 % — SIGNIFICANT CHANGE UP (ref 0–5)
GLUCOSE SERPL-MCNC: 104 MG/DL — SIGNIFICANT CHANGE UP (ref 70–115)
HCT VFR BLD CALC: 43.1 % — SIGNIFICANT CHANGE UP (ref 42–52)
HGB BLD-MCNC: 14.4 G/DL — SIGNIFICANT CHANGE UP (ref 14–18)
LYMPHOCYTES # BLD AUTO: 2.3 K/UL — SIGNIFICANT CHANGE UP (ref 1–4.8)
LYMPHOCYTES # BLD AUTO: 39.2 % — SIGNIFICANT CHANGE UP (ref 20–55)
MAGNESIUM SERPL-MCNC: 2.1 MG/DL — SIGNIFICANT CHANGE UP (ref 1.6–2.6)
MCHC RBC-ENTMCNC: 27.5 PG — SIGNIFICANT CHANGE UP (ref 27–31)
MCHC RBC-ENTMCNC: 33.4 G/DL — SIGNIFICANT CHANGE UP (ref 32–36)
MCV RBC AUTO: 82.4 FL — SIGNIFICANT CHANGE UP (ref 80–94)
MONOCYTES # BLD AUTO: 0.5 K/UL — SIGNIFICANT CHANGE UP (ref 0–0.8)
MONOCYTES NFR BLD AUTO: 8.9 % — SIGNIFICANT CHANGE UP (ref 3–10)
NEUTROPHILS # BLD AUTO: 2.8 K/UL — SIGNIFICANT CHANGE UP (ref 1.8–8)
NEUTROPHILS NFR BLD AUTO: 47.6 % — SIGNIFICANT CHANGE UP (ref 37–73)
PHOSPHATE SERPL-MCNC: 3.9 MG/DL — SIGNIFICANT CHANGE UP (ref 2.4–4.7)
PLATELET # BLD AUTO: 245 K/UL — SIGNIFICANT CHANGE UP (ref 150–400)
POTASSIUM SERPL-MCNC: 4 MMOL/L — SIGNIFICANT CHANGE UP (ref 3.5–5.3)
POTASSIUM SERPL-SCNC: 4 MMOL/L — SIGNIFICANT CHANGE UP (ref 3.5–5.3)
PROT SERPL-MCNC: 6.8 G/DL — SIGNIFICANT CHANGE UP (ref 6.6–8.7)
RBC # BLD: 5.23 M/UL — SIGNIFICANT CHANGE UP (ref 4.6–6.2)
RBC # FLD: 13.1 % — SIGNIFICANT CHANGE UP (ref 11–15.6)
SODIUM SERPL-SCNC: 141 MMOL/L — SIGNIFICANT CHANGE UP (ref 135–145)
WBC # BLD: 6 K/UL — SIGNIFICANT CHANGE UP (ref 4.8–10.8)
WBC # FLD AUTO: 6 K/UL — SIGNIFICANT CHANGE UP (ref 4.8–10.8)

## 2018-12-04 PROCEDURE — 88304 TISSUE EXAM BY PATHOLOGIST: CPT | Mod: 26

## 2018-12-04 PROCEDURE — 99232 SBSQ HOSP IP/OBS MODERATE 35: CPT | Mod: GC

## 2018-12-04 PROCEDURE — 99231 SBSQ HOSP IP/OBS SF/LOW 25: CPT

## 2018-12-04 PROCEDURE — 47562 LAPAROSCOPIC CHOLECYSTECTOMY: CPT

## 2018-12-04 RX ORDER — ONDANSETRON 8 MG/1
4 TABLET, FILM COATED ORAL ONCE
Qty: 0 | Refills: 0 | Status: DISCONTINUED | OUTPATIENT
Start: 2018-12-04 | End: 2018-12-04

## 2018-12-04 RX ORDER — OXYCODONE AND ACETAMINOPHEN 5; 325 MG/1; MG/1
1 TABLET ORAL EVERY 4 HOURS
Qty: 0 | Refills: 0 | Status: DISCONTINUED | OUTPATIENT
Start: 2018-12-04 | End: 2018-12-05

## 2018-12-04 RX ORDER — HYDROMORPHONE HYDROCHLORIDE 2 MG/ML
1 INJECTION INTRAMUSCULAR; INTRAVENOUS; SUBCUTANEOUS
Qty: 0 | Refills: 0 | Status: DISCONTINUED | OUTPATIENT
Start: 2018-12-04 | End: 2018-12-04

## 2018-12-04 RX ORDER — ONDANSETRON 8 MG/1
4 TABLET, FILM COATED ORAL ONCE
Qty: 0 | Refills: 0 | Status: COMPLETED | OUTPATIENT
Start: 2018-12-04 | End: 2018-12-04

## 2018-12-04 RX ORDER — SODIUM CHLORIDE 9 MG/ML
1000 INJECTION, SOLUTION INTRAVENOUS
Qty: 0 | Refills: 0 | Status: DISCONTINUED | OUTPATIENT
Start: 2018-12-04 | End: 2018-12-04

## 2018-12-04 RX ORDER — FENTANYL CITRATE 50 UG/ML
50 INJECTION INTRAVENOUS
Qty: 0 | Refills: 0 | Status: DISCONTINUED | OUTPATIENT
Start: 2018-12-04 | End: 2018-12-04

## 2018-12-04 RX ORDER — INDOMETHACIN 50 MG
100 CAPSULE ORAL ONCE
Qty: 0 | Refills: 0 | Status: DISCONTINUED | OUTPATIENT
Start: 2018-12-04 | End: 2018-12-05

## 2018-12-04 RX ORDER — METOPROLOL TARTRATE 50 MG
50 TABLET ORAL
Qty: 0 | Refills: 0 | Status: DISCONTINUED | OUTPATIENT
Start: 2018-12-04 | End: 2018-12-05

## 2018-12-04 RX ADMIN — Medication 50 MILLIGRAM(S): at 17:34

## 2018-12-04 RX ADMIN — Medication 50 MILLIGRAM(S): at 05:14

## 2018-12-04 RX ADMIN — ONDANSETRON 4 MILLIGRAM(S): 8 TABLET, FILM COATED ORAL at 17:00

## 2018-12-04 NOTE — PROGRESS NOTE ADULT - ASSESSMENT
Hyperthryroidism  contMMI 5 mg tid ac  check T4 And T3 in AM   watch for symtpoms of thyroid hormone excess

## 2018-12-04 NOTE — PROGRESS NOTE ADULT - ASSESSMENT
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    Plan:    DVT prophylaxis recommended.  Neurologically cleared to undergo CCY as no epilepsy identified on clinical and diagnostic regimen for now.  D/w pt, wife and medical team in great detail.  Offered Psych Eval and counseling, but pt doesn't wish to pursue for now.  Does reports of being stressed as trying to purchase home.  No suicidal ideation nor any thoughts of hurting himself.  Neuro stable.  Outpatient follow up at [facility] at [phone] or [phone].  Reconsult PRN.

## 2018-12-04 NOTE — BRIEF OPERATIVE NOTE - PRE-OP DX
Biliary colic  12/04/2018    Active  Jordyn Aquino  Calculus of bile duct without cholecystitis with obstruction  11/30/2018    Active  Trav Carr  Obstructive jaundice  11/30/2018    Active  Trav Carr
Calculus of bile duct without cholecystitis with obstruction  11/30/2018    Active  Trav Carr  Obstructive jaundice  11/30/2018    Active  Trav Carr

## 2018-12-04 NOTE — PROGRESS NOTE ADULT - SUBJECTIVE AND OBJECTIVE BOX
cc: epigastric pain.     Overnight/AM Events:  Patient seen and examined this morning. Pt was npo last night for lap ba today. Denies new episode of shaking/involuntary movements. This morning at bedside patient denies chest pain, palpitations, sob, dizziness. He is ambulating and voiding without difficulties.     ROS: No chest pain, shortness of breath, nausea/vomiting, lightheadedness, LE edema or other symptoms. All other ROS negative     Cardiac monitor: A-flutter, episode of tachycardia with hr 150s with exertion and self resolves to the 70-80s seconds later.    Vital Signs Last 24 Hrs  T(C): 36.7 (04 Dec 2018 11:28), Max: 37.1 (03 Dec 2018 22:17)  T(F): 98.1 (04 Dec 2018 11:28), Max: 98.8 (03 Dec 2018 22:17)  HR: 76 (04 Dec 2018 11:28) (76 - 114)  BP: 118/58 (04 Dec 2018 11:28) (102/60 - 128/62)  RR: 18 (04 Dec 2018 11:28) (14 - 18)  SpO2: 100% (04 Dec 2018 11:28) (96% - 100%)      Physical exam:  Gen: Adult  male, NAD, comfortable  Head: NC/AT  Eyes: EOMI, Perrla 3mm b/l  Throat: moist mucous membranes, tonsils normal  Pulm: CTAB, no wheezes or crackles  Cardio: RRR, normal +S1/S2, no murmurs  GI: +BS, soft, non distended, non-tender, no guarding  Extremities: no pitting edema, no calf tenderness. 2+ radial and dorsalis pedal pulses  Neuro: alert and oriented, no gross sensory or motor deficits.   Psych: Normal speech and affect, good eye contact.                          14.4   6.0   )-----------( 245      ( 04 Dec 2018 06:16 )             43.1     12-04    141  |  104  |  16.0  ----------------------------<  104  4.0   |  26.0  |  0.71    Ca    9.1      04 Dec 2018 06:16  Phos  3.9     12-04  Mg     2.1     12-04    TPro  6.8  /  Alb  3.9  /  TBili  1.0  /  DBili  0.4<H>  /  AST  46<H>  /  ALT  164<H>  /  AlkPhos  94  12-04    MEDICATIONS  (STANDING):  indomethacin Suppository 100 milliGRAM(s) Rectal once  LORazepam   Injectable 1 milliGRAM(s) IntraMuscular once  methimazole 5 milliGRAM(s) Oral three times a day  metoprolol tartrate 50 milliGRAM(s) Oral two times a day    MEDICATIONS  (PRN):  naproxen 375 milliGRAM(s) Oral every 12 hours PRN Mild Pain (1 - 3)  oxyCODONE    IR 5 milliGRAM(s) Oral every 8 hours PRN Severe Pain (7 - 10)      EEG  12/3/18    Specific features: No focal, lateralizing, or epileptiform activity was present. There was significant movement artifact during hyperventilation and photic stimulation.   In addition the patient had episode described as shaking toward the end of the record. The episodes had no epileptic correlate but did consist of significant movement artifact.     Impression: This is a normal record. The episodes of shaking and movement had no epileptic correlate.     MR Head w/ and w/o contrast:  Impression:  No acute intracranial pathology. No abnormal enhancement. cc: epigastric pain.     Overnight/AM Events:  Patient seen and examined this morning. Pt was npo last night for lap ba today. Denies new episode of shaking/involuntary movements. This morning at bedside patient denies chest pain, palpitations, sob, dizziness. He is ambulating and voiding without difficulties.     ROS: No chest pain, shortness of breath, nausea/vomiting, lightheadedness, LE edema or other symptoms. All other ROS negative     Cardiac monitor: A-flutter, episode of tachycardia with hr 150s with exertion and self resolves to the 70-80s seconds later.    Vital Signs Last 24 Hrs  T(C): 36.7 (04 Dec 2018 11:28), Max: 37.1 (03 Dec 2018 22:17)  T(F): 98.1 (04 Dec 2018 11:28), Max: 98.8 (03 Dec 2018 22:17)  HR: 76 (04 Dec 2018 11:28) (76 - 114)  BP: 118/58 (04 Dec 2018 11:28) (102/60 - 128/62)  RR: 18 (04 Dec 2018 11:28) (14 - 18)  SpO2: 100% (04 Dec 2018 11:28) (96% - 100%)    Physical exam:  Gen: Adult  male, NAD, comfortable  Head: NC/AT  Eyes: EOMI, Perrla 3mm b/l  Throat: moist mucous membranes, tonsils normal  Pulm: CTAB, no wheezes or crackles  Cardio: RRR, normal +S1/S2, no murmurs  GI: +BS, soft, non distended, non-tender, no guarding  Extremities: no pitting edema, no calf tenderness. 2+ radial and dorsalis pedal pulses  Neuro: alert and oriented, no gross sensory or motor deficits.   Psych: Normal speech and affect, good eye contact.        LABS:                   14.4   6.0   )-----------( 245      ( 04 Dec 2018 06:16 )             43.1     12-04    141  |  104  |  16.0  ----------------------------<  104  4.0   |  26.0  |  0.71    Ca    9.1      04 Dec 2018 06:16  Phos  3.9     12-04  Mg     2.1     12-04    TPro  6.8  /  Alb  3.9  /  TBili  1.0  /  DBili  0.4<H>  /  AST  46<H>  /  ALT  164<H>  /  AlkPhos  94  12-04      EEG  12/3/18    Specific features: No focal, lateralizing, or epileptiform activity was present. There was significant movement artifact during hyperventilation and photic stimulation.   In addition the patient had episode described as shaking toward the end of the record. The episodes had no epileptic correlate but did consist of significant movement artifact.     Impression: This is a normal record. The episodes of shaking and movement had no epileptic correlate.     MR Head w/ and w/o contrast:  Impression:  No acute intracranial pathology. No abnormal enhancement.

## 2018-12-04 NOTE — PROGRESS NOTE ADULT - ASSESSMENT
34 y/o male with PMH of Graves hyperthyroidism and Atrial Flutter, non-compliant with medication, presented with acute onset chest pain and found to be in Aflutter w/ RVR in the ED. Now rate controlled and chest pain has since resolved. Also found to have cholelithiasis w/o cholecystitis s/p ERCP with biliary sphincterotomy and removal of sludge and stone on 11/30. During his hospital course, patients family informed team of focal twitching. Per patient, he gets episodes where he gets stiff, twitches and can't verbalize. Pt was visualized having Involuntary movements/tremors yesterday. EEG was negative for seizures and Neuro cleared pt for lap ba. Pt is currently NPO pending surgery.        Atrial Fibrillation/flutter with RVR- not well controlled with intermittent uncontrolled rate   - Etiology secondary due to hyperthyroidism and non-compliance with meds  - c/w metoprolol 50mg q8h for now  - chadsvasc 0, no AC or aspirin indicated  - per cardio will defer cardioversion or ablation until euthyroid state is achieved as recurrence is high  - beta blocker for now, uptitrate massimo and post op as needed    Graves disease with mild hyperthyroidism  - methimazole 5mg resumed   - no PTU due to elevated lfts  - may need future therapy with MARTINES or surgery - can be discussed more outpt  - Endocrine consulted    Biliary Colic/Cholelithiasis without Cholecystitis with elevated lfts   - Sonographic Williamson negative, US noted for biliary sludge  - lap ba scheduled for today. Cleared by neuro. Pt currently NPO  - ERCP performed 11/30 - removal of stone and biliary sludge with sphincterotomy  - LFTs and Tbili trending down.   -Dietician Briseyda appreciated. Low fat meal plan after ba.    Seizure activity   - questionable Involuntary movements/tremors/ seizure-like activities s/p ERCP on 12/3. Last occurrence yesterday.   - possible metabolic etiology, but cannot rule out embolus given Afib/Aflutter  - The episodes of shaking and movement had no epileptic correlate on EEG  - MR head negative  - ativan prn     Preventive Measure   VCD boots 32 y/o male with PMH of Graves hyperthyroidism and Atrial Flutter, non-compliant with medication, presented with acute onset chest pain and found to be in Aflutter w/ RVR in the ED. Now rate controlled and chest pain has since resolved. Also found to have cholelithiasis w/o cholecystitis s/p ERCP with biliary sphincterotomy and removal of sludge and stone on 11/30. During his hospital course, patients family informed team of focal twitching. Per patient, he gets episodes where he gets stiff, twitches and can't verbalize. Pt was visualized having Involuntary movements/tremors yesterday. EEG was negative for seizures and Neuro cleared pt for lap ba. Pt is currently NPO pending surgery.      Atrial Fibrillation/flutter with RVR- not well controlled with intermittent uncontrolled rate   - Etiology secondary due to hyperthyroidism and non-compliance with meds  - c/w metoprolol 50mg q8h for now  - chadsvasc 0, no AC or aspirin indicated  - per cardio will defer cardioversion or ablation until euthyroid state is achieved as recurrence is high  - beta blocker for now, uptitrate massimo and post op as needed    Graves disease with mild hyperthyroidism  - methimazole 5mg resumed   - no PTU due to elevated lfts  - may need future therapy with MARTINES or surgery - can be discussed more outpt    Biliary Colic/Cholelithiasis without Cholecystitis with elevated lfts   - Sonographic Williamson negative, US noted for biliary sludge  - lap ba scheduled for today. Cleared by neuro. Pt currently NPO  - ERCP performed 11/30 - removal of stone and biliary sludge with sphincterotomy  - LFTs and Tbili trending down.     Seizure activity   - questionable Involuntary movements/tremors/ seizure-like activities s/p ERCP on 12/3. Last occurrence yesterday.   - possible metabolic etiology, but cannot rule out embolus given Afib/Aflutter  - MR head negative, ativan prn     Preventive Measure   VCD boots

## 2018-12-04 NOTE — BRIEF OPERATIVE NOTE - PROCEDURE
<<-----Click on this checkbox to enter Procedure Laparoscopic cholecystectomy  12/04/2018    [facility]

## 2018-12-04 NOTE — BRIEF OPERATIVE NOTE - POST-OP DX
Acute cholecystitis  12/04/2018    Active  Jordyn Aquino  Calculus of bile duct without cholecystitis with obstruction  11/30/2018    Active  Trav Carr
Calculus of bile duct without cholecystitis with obstruction  11/30/2018    Active  Trav Carr

## 2018-12-04 NOTE — PROGRESS NOTE ADULT - SUBJECTIVE AND OBJECTIVE BOX
INTERVAL HPI/OVERNIGHT EVENTS:  Pt now s/p lap ba   feels ok - has some right abdominal pain   MEDICATIONS  (STANDING):  indomethacin Suppository 100 milliGRAM(s) Rectal once  lactated ringers. 1000 milliLiter(s) (125 mL/Hr) IV Continuous <Continuous>  methimazole 5 milliGRAM(s) Oral three times a day  metoprolol tartrate 50 milliGRAM(s) Oral two times a day    MEDICATIONS  (PRN):  oxyCODONE    5 mG/acetaminophen 325 mG 1 Tablet(s) Oral every 4 hours PRN Moderate Pain (4 - 6)      Allergies    No Known Allergies    Intolerances        Review of systems:    Vital Signs Last 24 Hrs  T(C): 37.2 (04 Dec 2018 21:51), Max: 37.2 (04 Dec 2018 21:51)  T(F): 99 (04 Dec 2018 21:51), Max: 99 (04 Dec 2018 21:51)  HR: 75 (04 Dec 2018 21:51) (75 - 109)  BP: 120/60 (04 Dec 2018 21:51) (102/60 - 141/64)  BP(mean): --  RR: 18 (04 Dec 2018 21:51) (14 - 18)  SpO2: 100% (04 Dec 2018 21:51) (98% - 100%)    PHYSICAL EXAM:      Constitutional: NAD, well-groomed, well-developed  HEENT: PERRLA, EOMI, no exophalmos  Neck: No LAD, No JVD, trachea midline, no thyroid enlargement  Back: Normal spine flexure, No CVA tenderness  Respiratory: CTAB  Cardiovascular: S1 and S2, IRRR, no M/G/R  Extremities: No peripheral edema, no pedal lesions  Vascular: 2+ peripheral pulses  Neurological: A/O x 3, no focal deficits  Psychiatric: Normal mood, normal affect  Musculoskeletal: 5/5 strength b/l upper and lower extremities  Skin: No rashes, no acanthosis        LABS:                        14.4   6.0   )-----------( 245      ( 04 Dec 2018 06:16 )             43.1     12-04    141  |  104  |  16.0  ----------------------------<  104  4.0   |  26.0  |  0.71    Ca    9.1      04 Dec 2018 06:16  Phos  3.9     12-04  Mg     2.1     12-04    TPro  6.8  /  Alb  3.9  /  TBili  1.0  /  DBili  0.4<H>  /  AST  46<H>  /  ALT  164<H>  /  AlkPhos  94  12-04          CAPILLARY BLOOD GLUCOSE      RADIOLOGY & ADDITIONAL TESTS:

## 2018-12-04 NOTE — BRIEF OPERATIVE NOTE - OPERATION/FINDINGS
Inflamed gallbladder with stones Inflamed gallbladder with stones.  Sticky adhesions at infundibulum.  Critical view and cystic plate identified and viewed.

## 2018-12-04 NOTE — CHART NOTE - NSCHARTNOTEFT_GEN_A_CORE
Postoperative Note    HPI: Patient s/p Lap ba was seen resting well postoperatively with no major complaints. Patient has been voiding well, passing flatus but denies BM at this moment. He denies SOB, chest pain, diarrhea, nausea, vomiting, fever nor chills. Patient states that the pain is well managed at this moment.    PE:  General: Patient not in acute distress  HEENT: Moist oral mucosa, EOMI, PERRLA, no lymphadenopathy   Respiratory: CTAB, no wheezing nor rales  Cardio: RRR, normal S1, S2, no murmurs, gallops nor rubs.    GI: Soft, non-tender, non-distended, incision sites c/i/d  MSK: No pitting edema b/l   Vascular: 2+ radial and PT pulses b/l  Neuro: AAOX3, no neurosensory deficits, GCS 15    A/P: 33 year old man s/p Laparoscopic Cholecystectomy, hemodynamically stable and with no major complaints found to be recovering well at bedside.    -	Pain management  -	DVT prophylaxis   -	Advance diet as tolerated  -	Encourage I/S  -	Encourage ambulation  -             Rest of care per primary team

## 2018-12-04 NOTE — PROGRESS NOTE ADULT - SUBJECTIVE AND OBJECTIVE BOX
INTERVAL HISTORY:  Seen at bedside and noted for event yesterday while getting EEG.    No Known Allergies      VITAL SIGNS:  Vital Signs Last 24 Hrs  T(C): 36.4 (04 Dec 2018 05:11), Max: 37.1 (03 Dec 2018 22:17)  T(F): 97.6 (04 Dec 2018 05:11), Max: 98.8 (03 Dec 2018 22:17)  HR: 78 (04 Dec 2018 05:11) (78 - 114)  BP: 108/52 (04 Dec 2018 05:11) (102/60 - 128/62)  BP(mean): --  RR: 14 (04 Dec 2018 05:11) (14 - 16)  SpO2: 100% (04 Dec 2018 05:11) (96% - 100%)    PHYSICAL EXAMINATION:  General: Well-developed, well nourished, in no acute distress.  Eyes: Conjunctiva and sclera clear.  Neurologic:  - Mental Status:  Alert, awake, oriented to person, place, and time; Speech is normal; Affect is normal.  - Cranial Nerves: II-XI intact.  - Motor:  Strength is 5/5 throughout.  There is no pronator drift.  Normal muscle bulk and tone throughout.  - Reflexes:  2+ throughout  - Sensory:  Intact to light touch, pin prick, vibration, and joint-position sense throughout.  - Coordination:  No dysmetria/dysdiadochokinesis.    MEDS:  MEDICATIONS  (STANDING):  indomethacin Suppository 100 milliGRAM(s) Rectal once  LORazepam   Injectable 1 milliGRAM(s) IntraMuscular once  methimazole 5 milliGRAM(s) Oral three times a day  metoprolol tartrate 50 milliGRAM(s) Oral two times a day    MEDICATIONS  (PRN):  naproxen 375 milliGRAM(s) Oral every 12 hours PRN Mild Pain (1 - 3)  oxyCODONE    IR 5 milliGRAM(s) Oral every 8 hours PRN Severe Pain (7 - 10)      LABS:                          14.4   6.0   )-----------( 245      ( 04 Dec 2018 06:16 )             43.1     12-04    141  |  104  |  16.0  ----------------------------<  104  4.0   |  26.0  |  0.71    Ca    9.1      04 Dec 2018 06:16  Phos  3.9     12-04  Mg     2.1     12-04    TPro  6.8  /  Alb  3.9  /  TBili  1.0  /  DBili  0.4<H>  /  AST  46<H>  /  ALT  164<H>  /  AlkPhos  94  12-04    LIVER FUNCTIONS - ( 04 Dec 2018 06:16 )  Alb: 3.9 g/dL / Pro: 6.8 g/dL / ALK PHOS: 94 U/L / ALT: 164 U/L / AST: 46 U/L / GGT: x               RADIOLOGY & ADDITIONAL STUDIES:      CT Head No Cont (11.30.18 @ 21:57) >  1)  unremarkable CT study of the brain.  2)  follow-up MR imaging may be considered for further assessment.    MR Head w/wo IV Cont (12.03.18 @ 11:07) >  No acute intracranial pathology. No abnormal enhancement.     Awake and Asleep (12.03.18 @ 16:31) >  Impression: This is a normal record. The episodes of shaking and movement   had no epileptic correlate.

## 2018-12-04 NOTE — DIETITIAN INITIAL EVALUATION ADULT. - OTHER INFO
Pt currently NPO for lap ba, but reports good po intake prior.  Discussed importance of following low fat meal plan after ba- pt agreeable and verbalized understanding.

## 2018-12-05 ENCOUNTER — TRANSCRIPTION ENCOUNTER (OUTPATIENT)
Age: 33
End: 2018-12-05

## 2018-12-05 VITALS
RESPIRATION RATE: 18 BRPM | DIASTOLIC BLOOD PRESSURE: 74 MMHG | HEART RATE: 82 BPM | TEMPERATURE: 98 F | SYSTOLIC BLOOD PRESSURE: 110 MMHG

## 2018-12-05 PROBLEM — I48.92 UNSPECIFIED ATRIAL FLUTTER: Chronic | Status: ACTIVE | Noted: 2018-11-28

## 2018-12-05 PROBLEM — I10 ESSENTIAL (PRIMARY) HYPERTENSION: Chronic | Status: ACTIVE | Noted: 2018-11-28

## 2018-12-05 PROBLEM — E03.9 HYPOTHYROIDISM, UNSPECIFIED: Chronic | Status: ACTIVE | Noted: 2018-11-28

## 2018-12-05 LAB
ANION GAP SERPL CALC-SCNC: 12 MMOL/L — SIGNIFICANT CHANGE UP (ref 5–17)
BASOPHILS # BLD AUTO: 0 K/UL — SIGNIFICANT CHANGE UP (ref 0–0.2)
BASOPHILS NFR BLD AUTO: 0.1 % — SIGNIFICANT CHANGE UP (ref 0–2)
BUN SERPL-MCNC: 16 MG/DL — SIGNIFICANT CHANGE UP (ref 8–20)
CALCIUM SERPL-MCNC: 8.8 MG/DL — SIGNIFICANT CHANGE UP (ref 8.6–10.2)
CHLORIDE SERPL-SCNC: 101 MMOL/L — SIGNIFICANT CHANGE UP (ref 98–107)
CO2 SERPL-SCNC: 24 MMOL/L — SIGNIFICANT CHANGE UP (ref 22–29)
CREAT SERPL-MCNC: 0.72 MG/DL — SIGNIFICANT CHANGE UP (ref 0.5–1.3)
EOSINOPHIL # BLD AUTO: 0 K/UL — SIGNIFICANT CHANGE UP (ref 0–0.5)
EOSINOPHIL NFR BLD AUTO: 0.2 % — SIGNIFICANT CHANGE UP (ref 0–5)
GLUCOSE SERPL-MCNC: 125 MG/DL — HIGH (ref 70–115)
HCT VFR BLD CALC: 42.7 % — SIGNIFICANT CHANGE UP (ref 42–52)
HGB BLD-MCNC: 13.9 G/DL — LOW (ref 14–18)
LYMPHOCYTES # BLD AUTO: 1.6 K/UL — SIGNIFICANT CHANGE UP (ref 1–4.8)
LYMPHOCYTES # BLD AUTO: 18.5 % — LOW (ref 20–55)
MAGNESIUM SERPL-MCNC: 2.1 MG/DL — SIGNIFICANT CHANGE UP (ref 1.6–2.6)
MCHC RBC-ENTMCNC: 26.9 PG — LOW (ref 27–31)
MCHC RBC-ENTMCNC: 32.6 G/DL — SIGNIFICANT CHANGE UP (ref 32–36)
MCV RBC AUTO: 82.6 FL — SIGNIFICANT CHANGE UP (ref 80–94)
MONOCYTES # BLD AUTO: 0.7 K/UL — SIGNIFICANT CHANGE UP (ref 0–0.8)
MONOCYTES NFR BLD AUTO: 8 % — SIGNIFICANT CHANGE UP (ref 3–10)
NEUTROPHILS # BLD AUTO: 6.1 K/UL — SIGNIFICANT CHANGE UP (ref 1.8–8)
NEUTROPHILS NFR BLD AUTO: 73 % — SIGNIFICANT CHANGE UP (ref 37–73)
PHOSPHATE SERPL-MCNC: 3.1 MG/DL — SIGNIFICANT CHANGE UP (ref 2.4–4.7)
PLATELET # BLD AUTO: 281 K/UL — SIGNIFICANT CHANGE UP (ref 150–400)
POTASSIUM SERPL-MCNC: 4 MMOL/L — SIGNIFICANT CHANGE UP (ref 3.5–5.3)
POTASSIUM SERPL-SCNC: 4 MMOL/L — SIGNIFICANT CHANGE UP (ref 3.5–5.3)
RBC # BLD: 5.17 M/UL — SIGNIFICANT CHANGE UP (ref 4.6–6.2)
RBC # FLD: 13.3 % — SIGNIFICANT CHANGE UP (ref 11–15.6)
SODIUM SERPL-SCNC: 137 MMOL/L — SIGNIFICANT CHANGE UP (ref 135–145)
T3 SERPL-MCNC: 112 NG/DL — SIGNIFICANT CHANGE UP (ref 80–200)
T4 AB SER-ACNC: 10.6 UG/DL — SIGNIFICANT CHANGE UP (ref 4.5–12)
WBC # BLD: 8.4 K/UL — SIGNIFICANT CHANGE UP (ref 4.8–10.8)
WBC # FLD AUTO: 8.4 K/UL — SIGNIFICANT CHANGE UP (ref 4.8–10.8)

## 2018-12-05 PROCEDURE — 76705 ECHO EXAM OF ABDOMEN: CPT

## 2018-12-05 PROCEDURE — 84484 ASSAY OF TROPONIN QUANT: CPT

## 2018-12-05 PROCEDURE — 95819 EEG AWAKE AND ASLEEP: CPT

## 2018-12-05 PROCEDURE — 86901 BLOOD TYPING SEROLOGIC RH(D): CPT

## 2018-12-05 PROCEDURE — 70450 CT HEAD/BRAIN W/O DYE: CPT

## 2018-12-05 PROCEDURE — 84443 ASSAY THYROID STIM HORMONE: CPT

## 2018-12-05 PROCEDURE — 88304 TISSUE EXAM BY PATHOLOGIST: CPT

## 2018-12-05 PROCEDURE — 80074 ACUTE HEPATITIS PANEL: CPT

## 2018-12-05 PROCEDURE — 83690 ASSAY OF LIPASE: CPT

## 2018-12-05 PROCEDURE — 83735 ASSAY OF MAGNESIUM: CPT

## 2018-12-05 PROCEDURE — 83036 HEMOGLOBIN GLYCOSYLATED A1C: CPT

## 2018-12-05 PROCEDURE — 84480 ASSAY TRIIODOTHYRONINE (T3): CPT

## 2018-12-05 PROCEDURE — 84436 ASSAY OF TOTAL THYROXINE: CPT

## 2018-12-05 PROCEDURE — 93005 ELECTROCARDIOGRAM TRACING: CPT

## 2018-12-05 PROCEDURE — 85027 COMPLETE CBC AUTOMATED: CPT

## 2018-12-05 PROCEDURE — 93306 TTE W/DOPPLER COMPLETE: CPT

## 2018-12-05 PROCEDURE — 70553 MRI BRAIN STEM W/O & W/DYE: CPT

## 2018-12-05 PROCEDURE — 86850 RBC ANTIBODY SCREEN: CPT

## 2018-12-05 PROCEDURE — 80048 BASIC METABOLIC PNL TOTAL CA: CPT

## 2018-12-05 PROCEDURE — 80053 COMPREHEN METABOLIC PANEL: CPT

## 2018-12-05 PROCEDURE — 99285 EMERGENCY DEPT VISIT HI MDM: CPT | Mod: 25

## 2018-12-05 PROCEDURE — 36415 COLL VENOUS BLD VENIPUNCTURE: CPT

## 2018-12-05 PROCEDURE — 76536 US EXAM OF HEAD AND NECK: CPT

## 2018-12-05 PROCEDURE — 81001 URINALYSIS AUTO W/SCOPE: CPT

## 2018-12-05 PROCEDURE — 86800 THYROGLOBULIN ANTIBODY: CPT

## 2018-12-05 PROCEDURE — 71046 X-RAY EXAM CHEST 2 VIEWS: CPT

## 2018-12-05 PROCEDURE — 84100 ASSAY OF PHOSPHORUS: CPT

## 2018-12-05 PROCEDURE — C1889: CPT

## 2018-12-05 PROCEDURE — 85730 THROMBOPLASTIN TIME PARTIAL: CPT

## 2018-12-05 PROCEDURE — 74330 X-RAY BILE/PANC ENDOSCOPY: CPT

## 2018-12-05 PROCEDURE — C1769: CPT

## 2018-12-05 PROCEDURE — 85610 PROTHROMBIN TIME: CPT

## 2018-12-05 PROCEDURE — 82248 BILIRUBIN DIRECT: CPT

## 2018-12-05 PROCEDURE — 80061 LIPID PANEL: CPT

## 2018-12-05 PROCEDURE — 80076 HEPATIC FUNCTION PANEL: CPT

## 2018-12-05 PROCEDURE — 99239 HOSP IP/OBS DSCHRG MGMT >30: CPT

## 2018-12-05 PROCEDURE — 80307 DRUG TEST PRSMV CHEM ANLYZR: CPT

## 2018-12-05 PROCEDURE — 96375 TX/PRO/DX INJ NEW DRUG ADDON: CPT

## 2018-12-05 PROCEDURE — 86900 BLOOD TYPING SEROLOGIC ABO: CPT

## 2018-12-05 PROCEDURE — 96374 THER/PROPH/DIAG INJ IV PUSH: CPT

## 2018-12-05 RX ORDER — METHIMAZOLE 10 MG/1
1 TABLET ORAL
Qty: 90 | Refills: 0 | OUTPATIENT
Start: 2018-12-05 | End: 2019-01-03

## 2018-12-05 RX ORDER — METOPROLOL TARTRATE 50 MG
1 TABLET ORAL
Qty: 60 | Refills: 0 | OUTPATIENT
Start: 2018-12-05 | End: 2019-01-03

## 2018-12-05 RX ADMIN — OXYCODONE AND ACETAMINOPHEN 1 TABLET(S): 5; 325 TABLET ORAL at 12:00

## 2018-12-05 RX ADMIN — Medication 50 MILLIGRAM(S): at 06:37

## 2018-12-05 RX ADMIN — OXYCODONE AND ACETAMINOPHEN 1 TABLET(S): 5; 325 TABLET ORAL at 06:37

## 2018-12-05 RX ADMIN — OXYCODONE AND ACETAMINOPHEN 1 TABLET(S): 5; 325 TABLET ORAL at 11:59

## 2018-12-05 NOTE — DISCHARGE NOTE ADULT - ADDITIONAL INSTRUCTIONS
Please follow up with your PMD in 5-7 days after discharge.  Please follow up with your cardiologist for your AKamron Suarezer. Please follow up with your PMDs office (Dr. Abraham's office) in 2-3 days after discharge (you said you called them and they are open with a provider for you to see there)   Please follow up with your cardiologist for your A. Flutter (Mackenzie Navarro)   Please f/up with Dr. Prater endocrinologist in 1-2 weeks after discharge Please follow up with your PMDs office (Dr. Abraham's office) in 2-3 days after discharge (you said you called them and they are open with a provider for you to see there)   Please follow up with your cardiologist for your A. Flutter (Mackenzie Kuhn)   Please follow up with your Endocrinologist, Dr. Ferguson on   Please f/up with Dr. Prater endocrinologist in 1-2 weeks after discharge Please follow up with your PMDs office (Dr. Abraham's office) in 2-3 days after discharge (you said you called them and they are open with a provider for you to see there)   Please follow up with your cardiologist for your A. Flutter (Mackenzie Kuhn) in 5-7 days of discharge.  Please follow up with your Endocrinologist, Dr. Ferguson on   Please f/up with Dr. Prater endocrinologist in 1-2 weeks after discharge Please follow up with your PMDs office (Dr. Abraham's office) in 2-3 days after discharge (you said you called them and they are open with a provider for you to see there)   Please follow up with your cardiologist for your A. Flutter (Mackenzie Kuhn) in 5-7 days of discharge.  Please follow up with your Endocrinologist, Dr. Ferguson. You have an appointment on Monday Dec. 10th at 9:30 AM with Dr. Ferguson at [facility]. Please follow up your T3 and T4 results with Dr. Ferguson.

## 2018-12-05 NOTE — DISCHARGE NOTE ADULT - HOSPITAL COURSE
32 y/o male with PMH of Graves hyperthyroidism and Atrial Flutter, non-compliant with medication, presented with acute onset chest pain and found to be in Aflutter w/ RVR in the ED. He was rate controlled with metoprolol and resuming his thyroid medications. His chest pain has since resolved. Also found to have cholelithiasis w/o cholecystitis s/p ERCP with biliary sphincterotomy and removal of sludge and stone on 11/30. Pt was planned for surgery but during his hospital course, patients family informed team of focal twitching. Pt had neurology see him. Per patient, he gets episodes where he gets stiff, twitches and can't verbalize. Pt was visualized having Involuntary movements/tremors. MRI was negative. EEG was negative for seizures and Neuro cleared pt for lap ba. Pt had lap ba performed and diet was advanced. Pain is controlled and pt is tolerating PO solids.    All electrolyte abnormalities were monitored carefully and repleted as necessary during this hospitalization. At the time of discharge patient was hemodynamically stable and amenable to all terms of discharge. The patient has received verbal instructions from myself regarding discharge plans.     Length of Discharge: 45MIN 32 y/o male with PMH of Graves hyperthyroidism and Atrial Flutter, non-compliant with medication, presented with acute onset chest pain and found to be in Aflutter w/ RVR in the ED. He was rate controlled with metoprolol and resuming his thyroid medications. His chest pain has since resolved. Also found to have cholelithiasis w/o cholecystitis s/p ERCP with biliary sphincterotomy and removal of sludge and stone on 11/30. Pt was planned for surgery but during his hospital course, patients family informed team of focal twitching. Pt had neurology see him. Per patient, he gets episodes where he gets stiff, twitches and can't verbalize. Pt was visualized having Involuntary movements/tremors. MRI was negative. EEG was negative for seizures and Neuro cleared pt for lap ba. Pt had lap ba performed and diet was advanced. Pain is controlled and pt is tolerating PO solids.    All electrolyte abnormalities were monitored carefully and repleted as necessary during this hospitalization. At the time of discharge patient was hemodynamically stable and amenable to all terms of discharge. The patient has received verbal instructions from myself regarding discharge plans.     Plan d/w patient and fiance at bedside   Total time coordinating this discharge was 35 minutes.

## 2018-12-05 NOTE — CHART NOTE - NSCHARTNOTEFT_GEN_A_CORE
To whom it may concern:      Denny Rangel was seen and admitted at [facility] on 11/29/18 and was discharged on 12/5/18. Pt may return to work with full activity on December 10, 2018.

## 2018-12-05 NOTE — PROGRESS NOTE ADULT - ASSESSMENT
32 y/o male with PMH of Graves hyperthyroidism and Atrial Flutter, non-compliant with medication, presented with acute onset chest pain and found to be in Aflutter w/ RVR in the ED. Now rate controlled and chest pain has since resolved. Also found to have cholelithiasis w/o cholecystitis s/p ERCP with biliary sphincterotomy and removal of sludge and stone on 11/30. During his hospital course, patients family informed team of focal twitching. Per patient, he gets episodes where he gets stiff, twitches and can't verbalize. Pt was visualized having Involuntary movements/tremors yesterday. EEG was negative for seizures and Neuro cleared pt for lap ba. Pt is s/p lap ba done on 12/4 w/o any complications and planned for discharge to home today       Atrial Fibrillation/flutter with RVR- improved rate control   - Etiology secondary due to hyperthyroidism and non-compliance with meds  - c/w metoprolol 50mg q8h for now  - chadsvasc 0, no AC or aspirin indicated  - per cardio will defer cardioversion or ablation until euthyroid state is achieved as recurrence is high    Graves disease with mild hyperthyroidism  - methimazole 5mg tid, stat t3/t4 and f/up as OP for results (will make an endo appt for patient prior to d/c and get a cell # so that we can contact based on results)   -OP endo f/up     Biliary Colic/Cholelithiasis without Cholecystitis with elevated lfts   -s/p lap ba pod #1   - ERCP performed 11/30 - removal of stone and biliary sludge with sphincterotomy  - LFTs and Tbili trending down.     Seizure activity   -w/up negative. Likely isolated twitching vs pseudoseizure  -no further activity noted   -no medications indicated     Preventive Measure   VCD boots

## 2018-12-05 NOTE — CHART NOTE - NSCHARTNOTEFT_GEN_A_CORE
Pt will need follow up of his T3 and T4 results after discharge. We will notify if any abnormalities.  Pt has an appointment on Monday Dec. 10th at 9:30 AM with Dr. Ferguson at [facility]. He was instructed to follow up his T3 and T4 results with Dr. Ferguson. Pt phone number is [phone].

## 2018-12-05 NOTE — PROGRESS NOTE ADULT - ASSESSMENT
A/P: 33 year old male, s/p lap ba on 12/4 seen to be recovering well with no major complaints.       -	DVT prophylaxis   -	Encourage ambulation  -           No further surgical management needed at this time  -           Rest of care per primary team  -           Ok to discharge from surgical standpoint A/P: 33 year old male, s/p lap ba on 12/4 seen to be recovering well with no major complaints.       -	DVT prophylaxis   -	Encourage ambulation  -           No further surgical management needed at this time  -           Rest of care per primary team  -           Ok to discharge from surgical standpoint  -            Surgery will sign off at this time

## 2018-12-05 NOTE — DISCHARGE NOTE ADULT - INSTRUCTIONS
please refrain from strenuous exercise, heavy lifting greater than 10 pounds, contact sports  While on narcotics do not drive, operate heavy machinery, make any important decisions, drink alcohol Please refrain from strenuous exercise, heavy lifting greater than 10 pounds, contact sports  While on narcotics do not drive, operate heavy machinery, make any important decisions, drink alcohol.

## 2018-12-05 NOTE — DISCHARGE NOTE ADULT - PROVIDER TOKENS
FREE:[LAST:[acute care surgery outpatient clinic],PHONE:[phone]],FAX:[(   )    -],ADDRESS:[[address] [place] [address]]] FREE:[LAST:[acute care surgery outpatient clinic],PHONE:[phone]],FAX:[(   )    -],ADDRESS:[[address] [place] [address]]],TOKEN:'[id number]:'[id number] TOKEN:'[id number]:'[id number]',FREE:[LAST:[Dr Jhonatan Abraham],PHONE:[(   )    -],FAX:[(   )    -],ADDRESS:[[address] [place] [address]  [phone]]]

## 2018-12-05 NOTE — DISCHARGE NOTE ADULT - MEDICATION SUMMARY - MEDICATIONS TO TAKE
I will START or STAY ON the medications listed below when I get home from the hospital:    oxyCODONE-acetaminophen 5 mg-325 mg oral tablet  -- 1 tab(s) by mouth every 6 hours, As Needed -Moderate Pain (4 - 6) MDD:4  -- Indication: For pain    methIMAzole 5 mg oral tablet  -- 1 tab(s) by mouth 3 times a day  -- Indication: For Hyperthyroidism    metoprolol tartrate 50 mg oral tablet  -- 1 tab(s) by mouth 2 times a day  -- Indication: For Atrial flutter

## 2018-12-05 NOTE — PROGRESS NOTE ADULT - REASON FOR ADMISSION
chest pain

## 2018-12-05 NOTE — PROGRESS NOTE ADULT - ATTENDING COMMENTS
Note addended where needed. Plan discussed with patient at bedside with fiance present. Also d/w neuro   Please see discharge papers for details.
Patietn denies nausea/vomiting/abdominal pain. Abdomen is soft, nontedner.  Lap ba postponed for tomorrow because of EEG by neurology. However, during evening rounds, EEG still not performed.  Will pre op for lap ba in AM and will discuss with primary team/neurology if patient may proceed with lap ba prior to EEG.
Note addended where needed. Plan discussed with patient and fiance at bedside
Note addended where needed. Plan discussed with patient. Spoke to patient with fiance at bedside regarding need for compliance with medications in the future. PCP is Dr. Abraham - patient spoke to office regarding followup  Medically optimized for planned surgical intervention in am, cleared by cardio/endo
Note addended where needed. Plan discussed with patient at bedside with nuris. Patient staying in house for seizure w/up given his episode post procedure with GI
Note addended where needed. Plan discussed with patient at bedside. Awaiting EEG results for patient to be cleared for OR in am

## 2018-12-05 NOTE — DISCHARGE NOTE ADULT - PLAN OF CARE
to be pain free and return to daily activities of living Please follow up with the acute care surgery outpatient clinic in 10-14 days of discharge, if you should have any change in clinical status please return to the emergency room as soon as possible stable post-op Also found to have cholelithiasis without cholecystitis after a ERCP was performed.  During ERCP you had biliary sphincterotomy and removal of sludge and stones. You were cleared by neurology for your twitching/involuntary muscle contractions and had your laparoscopic cholecystectomy performed. After surgery your pain was well controlled with percocet and you were able to tolerate a solid diet. If you have any signs of fever or abdominal pain, please go to your doctor or nearest emergency room. Please take your pain medications as prescribed. stable You presented with Atrial Flutter with RVR. Your rate was not controlled. This is likely due to your hyperthyroidism and medication noncompliance. You were restarted on your thyroid medication and were given metoprolol which normalized your HR. You have a history of graves disease. We restarted you on methimazole but held the PTU due to elevated liver function tests. Follow up with your endocrinologist for possible future therapy with MARTINES or surgery.  Take your medications as prescribed. Your family reported episodes where you get stiff, twitch, and can't verbalize. You were visualized having involuntary movements/tremors. Neurology was consulted. MRI was negative. EEG was negative for seizures.  These episodes did not recur. You were cleared by neurology for surgery. Also found to have cholelithiasis without cholecystitis after a ERCP was performed.  During ERCP you had biliary sphincterotomy and removal of sludge and stones. You were cleared by neurology for your twitching/involuntary muscle contractions and had your laparoscopic cholecystectomy performed. After surgery your pain was well controlled with percocet and you were able to tolerate a solid diet. If you have any signs of fever or abdominal pain, please go to your doctor or nearest emergency room. Please take your pain medications as prescribed.  You had your cholecystectomy done on 12/4 and are cleared for discharge by surgery today 12/5   Pain control as needd You presented with Atrial Flutter with RVR. Your rate was not controlled. This is likely due to your hyperthyroidism and medication noncompliance. You were restarted on your thyroid medication and were given metoprolol which normalized your HR.  Please follow up with endocrinology for further plan of care You have a history of graves disease. We restarted you on methimazole but held the PTU due to elevated liver function tests. Follow up with your endocrinologist for possible future therapy with MARTINES or surgery.  Take your medications as prescribed.  You had blood work done on the day of discharge, we took your cell # from you in case they are still elevated and we made an endocrine appt for you to  follow up those results (appt as below) Your family reported episodes where you get stiff, twitch, and can't verbalize. You were visualized having involuntary movements/tremors. Neurology was consulted. MRI was negative. EEG was negative for seizures.  These episodes did not recur. You were cleared by neurology for surgery.  In summary, you do not have organic seizures from your brain. We encourage that you hydrate well with water You have a history of graves disease. We restarted you on methimazole but held the PTU due to elevated liver function tests. Follow up with your endocrinologist for a thryoid check and possible future therapy with MARTINES or surgery.  Take your medications as prescribed.  You had blood work done on the day of discharge, we took your cell # from you in case they are still elevated and we made an endocrine appt for you to  follow up those results (appt as below)

## 2018-12-05 NOTE — DISCHARGE NOTE ADULT - OTHER SIGNIFICANT FINDINGS
13.9   8.4   )-----------( 281      ( 05 Dec 2018 06:06 )             42.7   12-05    137  |  101  |  16.0  ----------------------------<  125<H>  4.0   |  24.0  |  0.72    Ca    8.8      05 Dec 2018 06:06  Phos  3.1     12-05  Mg     2.1     12-05    TPro  6.8  /  Alb  3.9  /  TBili  1.0  /  DBili  0.4<H>  /  AST  46<H>  /  ALT  164<H>  /  AlkPhos  94  12-04    ICU Vital Signs Last 24 Hrs  T(C): 36.7 (05 Dec 2018 11:31), Max: 37.2 (04 Dec 2018 21:51)  T(F): 98.1 (05 Dec 2018 11:31), Max: 99 (04 Dec 2018 21:51)  HR: 74 (05 Dec 2018 11:31) (74 - 109)  BP: 100/69 (05 Dec 2018 11:31) (100/69 - 141/64)  RR: 18 (05 Dec 2018 11:31) (14 - 18)  SpO2: 98% (05 Dec 2018 11:31) (95% - 100%) 13.9   8.4   )-----------( 281      ( 05 Dec 2018 06:06 )             42.7   12-05    137  |  101  |  16.0    ----------------------------<  125<H>  4.0   |  24.0  |  0.72    Ca    8.8      05 Dec 2018 06:06  Phos  3.1     12-05  Mg     2.1     12-05    TPro  6.8  /  Alb  3.9  /  TBili  1.0  /  DBili  0.4<H>  /  AST  46<H>  /  ALT  164<H>  /  AlkPhos  94  12-04    < from: MR Head w/wo IV Cont (12.03.18 @ 11:07) >  IMPRESSION: No acute intracranial pathology. No abnormal enhancement.    < end of copied text >    < from: TTE Echo Complete w/Doppler (11.29.18 @ 10:05) >  Summary:   1. Left ventricular ejection fraction, by visual estimation, is 50 to   55%.   2. Low normal global left ventricular systolic function.   3. The mitral in-flow pattern reveals no discernable A-wave, therefore   no comment on diastolic function can be made.   4. There is no evidence of pericardial effusion.   5. Thickening of the anterior and posterior mitral valve leaflets.   6. Mild tricuspid regurgitation.   7. Pulmonic valve regurgitation.   8. Estimated pulmonary artery systolic pressure is 39.6 mmHg assuming a   right atrial pressure of 15 mmHg, which is consistent with borderline   pulmonary hypertension.    < end of copied text >    < from: EEG Awake and Asleep (12.03.18 @ 16:31) >  Impression: This is a normal record. The episodes of shaking and movement   had no epileptic correlate.     < end of copied text >

## 2018-12-05 NOTE — DISCHARGE NOTE ADULT - CARE PLAN
Principal Discharge DX:	Biliary colic  Goal:	to be pain free and return to daily activities of living  Assessment and plan of treatment:	Please follow up with the acute care surgery outpatient clinic in 10-14 days of discharge, if you should have any change in clinical status please return to the emergency room as soon as possible Principal Discharge DX:	Biliary colic  Goal:	stable post-op  Assessment and plan of treatment:	Also found to have cholelithiasis without cholecystitis after a ERCP was performed.  During ERCP you had biliary sphincterotomy and removal of sludge and stones. You were cleared by neurology for your twitching/involuntary muscle contractions and had your laparoscopic cholecystectomy performed. After surgery your pain was well controlled with percocet and you were able to tolerate a solid diet. If you have any signs of fever or abdominal pain, please go to your doctor or nearest emergency room. Please take your pain medications as prescribed.  Secondary Diagnosis:	Atrial flutter  Goal:	stable  Assessment and plan of treatment:	You presented with Atrial Flutter with RVR. Your rate was not controlled. This is likely due to your hyperthyroidism and medication noncompliance. You were restarted on your thyroid medication and were given metoprolol which normalized your HR.  Secondary Diagnosis:	Graves disease  Goal:	stable  Assessment and plan of treatment:	You have a history of graves disease. We restarted you on methimazole but held the PTU due to elevated liver function tests. Follow up with your endocrinologist for possible future therapy with MARTINES or surgery.  Take your medications as prescribed.  Secondary Diagnosis:	Abnormal movements  Goal:	stable  Assessment and plan of treatment:	Your family reported episodes where you get stiff, twitch, and can't verbalize. You were visualized having involuntary movements/tremors. Neurology was consulted. MRI was negative. EEG was negative for seizures.  These episodes did not recur. You were cleared by neurology for surgery. Principal Discharge DX:	Biliary colic  Goal:	stable post-op  Assessment and plan of treatment:	Also found to have cholelithiasis without cholecystitis after a ERCP was performed.  During ERCP you had biliary sphincterotomy and removal of sludge and stones. You were cleared by neurology for your twitching/involuntary muscle contractions and had your laparoscopic cholecystectomy performed. After surgery your pain was well controlled with percocet and you were able to tolerate a solid diet. If you have any signs of fever or abdominal pain, please go to your doctor or nearest emergency room. Please take your pain medications as prescribed.  You had your cholecystectomy done on 12/4 and are cleared for discharge by surgery today 12/5   Pain control as needd  Secondary Diagnosis:	Atrial flutter  Goal:	stable  Assessment and plan of treatment:	You presented with Atrial Flutter with RVR. Your rate was not controlled. This is likely due to your hyperthyroidism and medication noncompliance. You were restarted on your thyroid medication and were given metoprolol which normalized your HR.  Please follow up with endocrinology for further plan of care  Secondary Diagnosis:	Graves disease  Goal:	stable  Assessment and plan of treatment:	You have a history of graves disease. We restarted you on methimazole but held the PTU due to elevated liver function tests. Follow up with your endocrinologist for possible future therapy with MARTINES or surgery.  Take your medications as prescribed.  You had blood work done on the day of discharge, we took your cell # from you in case they are still elevated and we made an endocrine appt for you to  follow up those results (appt as below)  Secondary Diagnosis:	Abnormal movements  Goal:	stable  Assessment and plan of treatment:	Your family reported episodes where you get stiff, twitch, and can't verbalize. You were visualized having involuntary movements/tremors. Neurology was consulted. MRI was negative. EEG was negative for seizures.  These episodes did not recur. You were cleared by neurology for surgery.  In summary, you do not have organic seizures from your brain. We encourage that you hydrate well with water Principal Discharge DX:	Biliary colic  Goal:	stable post-op  Assessment and plan of treatment:	Also found to have cholelithiasis without cholecystitis after a ERCP was performed.  During ERCP you had biliary sphincterotomy and removal of sludge and stones. You were cleared by neurology for your twitching/involuntary muscle contractions and had your laparoscopic cholecystectomy performed. After surgery your pain was well controlled with percocet and you were able to tolerate a solid diet. If you have any signs of fever or abdominal pain, please go to your doctor or nearest emergency room. Please take your pain medications as prescribed.  You had your cholecystectomy done on 12/4 and are cleared for discharge by surgery today 12/5   Pain control as needd  Secondary Diagnosis:	Atrial flutter  Goal:	stable  Assessment and plan of treatment:	You presented with Atrial Flutter with RVR. Your rate was not controlled. This is likely due to your hyperthyroidism and medication noncompliance. You were restarted on your thyroid medication and were given metoprolol which normalized your HR.  Please follow up with endocrinology for further plan of care  Secondary Diagnosis:	Graves disease  Goal:	stable  Assessment and plan of treatment:	You have a history of graves disease. We restarted you on methimazole but held the PTU due to elevated liver function tests. Follow up with your endocrinologist for a thryoid check and possible future therapy with MARTINES or surgery.  Take your medications as prescribed.  You had blood work done on the day of discharge, we took your cell # from you in case they are still elevated and we made an endocrine appt for you to  follow up those results (appt as below)  Secondary Diagnosis:	Abnormal movements  Goal:	stable  Assessment and plan of treatment:	Your family reported episodes where you get stiff, twitch, and can't verbalize. You were visualized having involuntary movements/tremors. Neurology was consulted. MRI was negative. EEG was negative for seizures.  These episodes did not recur. You were cleared by neurology for surgery.  In summary, you do not have organic seizures from your brain. We encourage that you hydrate well with water

## 2018-12-05 NOTE — DISCHARGE NOTE ADULT - CARE PROVIDER_API CALL
acute care surgery outpatient clinic,   [address] [address]  Phone: [phone]  Fax: (   )    - acute care surgery outpatient clinic,   [address] [address]  Phone: [phone]  Fax: (   )    -    Carlene Hoyos), EndocrinologyMetabDiabetes; Internal Medicine  [facility] [place] [address]  Phone: [phone]  Fax: [phone]    Ruddy Navarro), Cardiovascular Disease; Internal Medicine  [address] [facility]  [address]  [place] [address]  Phone: [phone]  Fax: [phone] Ruddy Navarro), Cardiovascular Disease; Internal Medicine  [address] [facility]  [address]  [place] [address]  Phone: [phone]  Fax: [phone]    Chauncey Ferguson), EndocrinologyMetabDiabetes; Internal Medicine  [address] [place] [address]  Phone: [phone]  Fax: [phone]    Dr Jhonatan Abraham,   [address] [place] [address]  [phone]  Phone: (   )    -  Fax: (   )    -

## 2018-12-05 NOTE — DISCHARGE NOTE ADULT - PATIENT PORTAL LINK FT
You can access the [facility] Patient Portal, offered by [facility], by registering with the following website: [email or web address]

## 2018-12-05 NOTE — PROGRESS NOTE ADULT - PROVIDER SPECIALTY LIST ADULT
Cardiology
Endocrinology
Family Medicine
Gastroenterology
Hospitalist
Neurology
Surgery
Family Medicine

## 2018-12-05 NOTE — PROGRESS NOTE ADULT - SUBJECTIVE AND OBJECTIVE BOX
cc: epigastric pain.     Overnight/AM Events:  Patient seen and examined at bedside. No acute distress and no acute overnight events. s/p Lap ba post op day #1 w/o any complications. Denies any pain at this time. States that he is ready to go home     ROS: No chest pain, shortness of breath, nausea/vomiting, lightheadedness, LE edema or other symptoms. All other ROS negative     Cardiac monitor: A-flutter rate up to 130s with ambulation     Vital Signs Last 24 Hrs  T(C): 36.7 (05 Dec 2018 11:31), Max: 37.2 (04 Dec 2018 21:51)  T(F): 98.1 (05 Dec 2018 11:31), Max: 99 (04 Dec 2018 21:51)  HR: 74 (05 Dec 2018 11:31) (74 - 109)  BP: 100/69 (05 Dec 2018 11:31) (100/69 - 141/64)  RR: 18 (05 Dec 2018 11:31) (14 - 18)  SpO2: 98% (05 Dec 2018 11:31) (95% - 100%)    Physical exam:  Gen: Adult  male, NAD, comfortable  Head: NC/AT  Eyes: EOMI, Perrla 3mm b/l  Throat: moist mucous membranes, tonsils normal  Pulm: CTAB, no wheezes or crackles  Cardio: RRR, normal +S1/S2, no murmurs  GI: +BS, soft, non distended, non-tender, no guarding. 3 incisions from surgery noted; c/d/i  Extremities: no pitting edema, no calf tenderness. 2+ radial and dorsalis pedal pulses  Neuro: alert and oriented, no gross sensory or motor deficits.         LABS:                              13.9   8.4   )-----------( 281      ( 05 Dec 2018 06:06 )             42.7         EEG  12/3/18    Specific features: No focal, lateralizing, or epileptiform activity was present. There was significant movement artifact during hyperventilation and photic stimulation.   In addition the patient had episode described as shaking toward the end of the record. The episodes had no epileptic correlate but did consist of significant movement artifact.     Impression: This is a normal record. The episodes of shaking and movement had no epileptic correlate.     MR Head w/ and w/o contrast:  Impression:  No acute intracranial pathology. No abnormal enhancement.

## 2018-12-05 NOTE — PROGRESS NOTE ADULT - SUBJECTIVE AND OBJECTIVE BOX
INTERVAL HISTORY: denies CP,SOB, palpitation, syncope  	  MEDICATIONS:  metoprolol tartrate 50 milliGRAM(s) Oral two times a day  indomethacin Suppository 100 milliGRAM(s) Rectal once  oxyCODONE    5 mG/acetaminophen 325 mG 1 Tablet(s) Oral every 4 hours PRN  methimazole 5 milliGRAM(s) Oral three times a day          PHYSICAL EXAM:  T(C): 36.8 (12-05-18 @ 06:31), Max: 37.2 (12-04-18 @ 21:51)  HR: 75 (12-05-18 @ 06:31) (75 - 109)  BP: 104/60 (12-05-18 @ 06:31) (104/60 - 141/64)  RR: 18 (12-05-18 @ 06:31) (14 - 18)  SpO2: 95% (12-05-18 @ 06:31) (95% - 100%)  Wt(kg): --  I&O's Summary    04 Dec 2018 07:01  -  05 Dec 2018 07:00  --------------------------------------------------------  IN: 0 mL / OUT: 1000 mL / NET: -1000 mL      Height (cm): 180 (12-04 @ 13:48)  Weight (kg): 80 (12-04 @ 13:48)  BMI (kg/m2): 24.7 (12-04 @ 13:48)  BSA (m2): 2 (12-04 @ 13:48)    Appearance: Normal	  HEENT:   Normal oral mucosa  Cardiovascular: Normal S1 S2, No JVD, No murmurs, No edema  Respiratory: Lungs clear to auscultation	  Psychiatry: A & O x 3, Mood & affect appropriate  Gastrointestinal:  Soft, Non-tender, + BS	  Skin: No rashes, No ecchymoses, No cyanosis  Neurologic: Non-focal  Extremities: Normal range of motion, No clubbing, cyanosis or edema  Vascular: Peripheral pulses palpable 2+ bilaterally    TELEMETRY: AFlutter	    	  LABS:	 	                          13.9   8.4   )-----------( 281      ( 05 Dec 2018 06:06 )             42.7     12-05    137  |  101  |  16.0  ----------------------------<  125<H>  4.0   |  24.0  |  0.72    Ca    8.8      05 Dec 2018 06:06  Phos  3.1     12-05  Mg     2.1     12-05    TPro  6.8  /  Alb  3.9  /  TBili  1.0  /  DBili  0.4<H>  /  AST  46<H>  /  ALT  164<H>  /  AlkPhos  94  12-04      ASSESSMENT/PLAN: 33y Male with past medical history significant for hyperthyroidism (pt self discontinued methimazole), remote paroxysmal AF a/w abdominal pain and new onset atrial flutter. No indication for OAC as his CHADSVASc score is 0.  Stable post op and for d/c home with out pt f/u.

## 2018-12-05 NOTE — PROGRESS NOTE ADULT - SUBJECTIVE AND OBJECTIVE BOX
INTERVAL HPI/OVERNIGHT EVENTS:  No acute overnight events reported. Patient was seen this AM at bedside resting with no major complaints. Patient tolerating regular diet, passing flatus and voiding regularly. Denies abdominal pain, nausea, vomiting, fevers, chills, SOB, nor chest pain.    MEDICATIONS  (STANDING):  indomethacin Suppository 100 milliGRAM(s) Rectal once  methimazole 5 milliGRAM(s) Oral three times a day  metoprolol tartrate 50 milliGRAM(s) Oral two times a day    MEDICATIONS  (PRN):  oxyCODONE    5 mG/acetaminophen 325 mG 1 Tablet(s) Oral every 4 hours PRN Moderate Pain (4 - 6)      Vital Signs Last 24 Hrs  T(C): 37.2 (04 Dec 2018 21:51), Max: 37.2 (04 Dec 2018 21:51)  T(F): 99 (04 Dec 2018 21:51), Max: 99 (04 Dec 2018 21:51)  HR: 75 (04 Dec 2018 21:51) (75 - 109)  BP: 120/60 (04 Dec 2018 21:51) (118/58 - 141/64)  BP(mean): --  RR: 18 (04 Dec 2018 21:51) (14 - 18)  SpO2: 100% (04 Dec 2018 21:51) (98% - 100%)    PE  Gen: Not in acute distress  Pulm: Non-labored breathing  CV: RRR, normal S1 and S2  Abd: Soft, non-distended, non-tender, incision sites c/i/d  : No suprapubic tenderness  Ext: no pitting edema b/l  Vasc: 2+ radial and PT pulses b/l  Neuro: AAOX3, no neurosensory deficits      I&O's Detail    03 Dec 2018 07:01  -  04 Dec 2018 07:00  --------------------------------------------------------  IN:    Oral Fluid: 480 mL  Total IN: 480 mL    OUT:    Voided: 800 mL  Total OUT: 800 mL    Total NET: -320 mL      04 Dec 2018 07:01  -  05 Dec 2018 06:24  --------------------------------------------------------  IN:  Total IN: 0 mL    OUT:    Voided: 1000 mL  Total OUT: 1000 mL    Total NET: -1000 mL          LABS:                        14.4   6.0   )-----------( 245      ( 04 Dec 2018 06:16 )             43.1     12-04    141  |  104  |  16.0  ----------------------------<  104  4.0   |  26.0  |  0.71    Ca    9.1      04 Dec 2018 06:16  Phos  3.9     12-04  Mg     2.1     12-04    TPro  6.8  /  Alb  3.9  /  TBili  1.0  /  DBili  0.4<H>  /  AST  46<H>  /  ALT  164<H>  /  AlkPhos  94  12-04          RADIOLOGY & ADDITIONAL STUDIES:

## 2018-12-08 LAB — SURGICAL PATHOLOGY FINAL REPORT - CH: SIGNIFICANT CHANGE UP

## 2018-12-10 ENCOUNTER — APPOINTMENT (OUTPATIENT)
Dept: ENDOCRINOLOGY | Facility: CLINIC | Age: 33
End: 2018-12-10
Payer: SELF-PAY

## 2018-12-10 VITALS
WEIGHT: 220 LBS | BODY MASS INDEX: 25.98 KG/M2 | OXYGEN SATURATION: 98 % | HEART RATE: 72 BPM | DIASTOLIC BLOOD PRESSURE: 70 MMHG | HEIGHT: 77 IN | SYSTOLIC BLOOD PRESSURE: 100 MMHG

## 2018-12-10 DIAGNOSIS — E05.00 THYROTOXICOSIS WITH DIFFUSE GOITER W/OUT THYROTOXIC CRISIS OR STORM: ICD-10-CM

## 2018-12-10 DIAGNOSIS — Z86.79 PERSONAL HISTORY OF OTHER DISEASES OF THE CIRCULATORY SYSTEM: ICD-10-CM

## 2018-12-10 PROCEDURE — 99213 OFFICE O/P EST LOW 20 MIN: CPT

## 2018-12-10 RX ORDER — METHIMAZOLE 5 MG/1
5 TABLET ORAL
Refills: 0 | Status: ACTIVE | COMMUNITY

## 2018-12-10 RX ORDER — METOPROLOL TARTRATE 50 MG/1
50 TABLET, FILM COATED ORAL
Refills: 0 | Status: ACTIVE | COMMUNITY

## 2019-04-11 ENCOUNTER — APPOINTMENT (OUTPATIENT)
Dept: ENDOCRINOLOGY | Facility: CLINIC | Age: 34
End: 2019-04-11

## 2020-01-22 NOTE — PATIENT PROFILE ADULT - NSPROMEDSHERBAL_GEN_A_NUR
[FreeTextEntry1] : Very pleasant 30-year-old gentleman who presents for evaluation of scrotal nodule and weak urinary stream. Patient reports that weak urinary stream started to occur over the last few months. He reports it is getting worse. He reports a feeling of incomplete bladder and taking. He denies dysuria. No hematuria. No flank pain. He does report mild suprapubic pain. He also reports noticing a scrotal nodule about a year ago. He was advised to see a urologist, however he did not do so at that time. No other complaints. [Urinary Urgency] : no urinary urgency [Urinary Retention] : no urinary retention [Urinary Frequency] : no urinary frequency [Straining] : straining [Weak Stream] : weak stream no

## 2020-05-04 NOTE — CONSULT NOTE ADULT - ASSESSMENT
Dr. Jeffers Graves' disease with mild hyperthyroidism. Reasonable to resume methimazole until final decision made for future therapy with M[facility]ES or surgery; can start now. Would not use PTU due to existing liver abnormalities.  Pt's ability to tolerate methimazole unclear, as possible that prior adverse effects were due to gallstones.  Clear for surgery from endocrine point of view. Will follow.

## 2020-11-24 ENCOUNTER — APPOINTMENT (OUTPATIENT)
Dept: HUMAN REPRODUCTION | Facility: CLINIC | Age: 35
End: 2020-11-24
Payer: SELF-PAY

## 2020-11-24 PROCEDURE — 89322 SEMEN ANAL STRICT CRITERIA: CPT

## 2021-04-05 ENCOUNTER — EMERGENCY (EMERGENCY)
Facility: HOSPITAL | Age: 36
LOS: 1 days | Discharge: HOME | End: 2021-04-05
Attending: EMERGENCY MEDICINE
Payer: COMMERCIAL

## 2021-04-05 VITALS
SYSTOLIC BLOOD PRESSURE: 121 MMHG | OXYGEN SATURATION: 100 % | HEART RATE: 75 BPM | RESPIRATION RATE: 16 BRPM | DIASTOLIC BLOOD PRESSURE: 77 MMHG

## 2021-04-05 VITALS
TEMPERATURE: 98 F | RESPIRATION RATE: 18 BRPM | DIASTOLIC BLOOD PRESSURE: 78 MMHG | SYSTOLIC BLOOD PRESSURE: 147 MMHG | HEART RATE: 123 BPM | OXYGEN SATURATION: 98 % | HEIGHT: 72 IN | WEIGHT: 199.96 LBS

## 2021-04-05 LAB
ALBUMIN SERPL ELPH-MCNC: 4.7 G/DL — SIGNIFICANT CHANGE UP (ref 3.3–5.2)
ALP SERPL-CCNC: 47 U/L — SIGNIFICANT CHANGE UP (ref 40–120)
ALT FLD-CCNC: 16 U/L — SIGNIFICANT CHANGE UP
ANION GAP SERPL CALC-SCNC: 12 MMOL/L — SIGNIFICANT CHANGE UP (ref 5–17)
ANISOCYTOSIS BLD QL: SLIGHT — SIGNIFICANT CHANGE UP
AST SERPL-CCNC: 20 U/L — SIGNIFICANT CHANGE UP
BASOPHILS # BLD AUTO: 0 K/UL — SIGNIFICANT CHANGE UP (ref 0–0.2)
BASOPHILS NFR BLD AUTO: 0 % — SIGNIFICANT CHANGE UP (ref 0–2)
BILIRUB SERPL-MCNC: 0.3 MG/DL — LOW (ref 0.4–2)
BUN SERPL-MCNC: 17 MG/DL — SIGNIFICANT CHANGE UP (ref 8–20)
BURR CELLS BLD QL SMEAR: PRESENT — SIGNIFICANT CHANGE UP
CALCIUM SERPL-MCNC: 8.8 MG/DL — SIGNIFICANT CHANGE UP (ref 8.6–10.2)
CHLORIDE SERPL-SCNC: 101 MMOL/L — SIGNIFICANT CHANGE UP (ref 98–107)
CO2 SERPL-SCNC: 25 MMOL/L — SIGNIFICANT CHANGE UP (ref 22–29)
CREAT SERPL-MCNC: 1.16 MG/DL — SIGNIFICANT CHANGE UP (ref 0.5–1.3)
EOSINOPHIL # BLD AUTO: 0.56 K/UL — HIGH (ref 0–0.5)
EOSINOPHIL NFR BLD AUTO: 4.4 % — SIGNIFICANT CHANGE UP (ref 0–6)
ETHANOL SERPL-MCNC: <10 MG/DL — SIGNIFICANT CHANGE UP (ref 0–9)
GLUCOSE SERPL-MCNC: 167 MG/DL — HIGH (ref 70–99)
HCT VFR BLD CALC: 44.6 % — SIGNIFICANT CHANGE UP (ref 39–50)
HGB BLD-MCNC: 14.7 G/DL — SIGNIFICANT CHANGE UP (ref 13–17)
LYMPHOCYTES # BLD AUTO: 43.9 % — SIGNIFICANT CHANGE UP (ref 13–44)
LYMPHOCYTES # BLD AUTO: 5.56 K/UL — HIGH (ref 1–3.3)
MANUAL SMEAR VERIFICATION: SIGNIFICANT CHANGE UP
MCHC RBC-ENTMCNC: 28.4 PG — SIGNIFICANT CHANGE UP (ref 27–34)
MCHC RBC-ENTMCNC: 33 GM/DL — SIGNIFICANT CHANGE UP (ref 32–36)
MCV RBC AUTO: 86.1 FL — SIGNIFICANT CHANGE UP (ref 80–100)
MICROCYTES BLD QL: SLIGHT — SIGNIFICANT CHANGE UP
MONOCYTES # BLD AUTO: 0.44 K/UL — SIGNIFICANT CHANGE UP (ref 0–0.9)
MONOCYTES NFR BLD AUTO: 3.5 % — SIGNIFICANT CHANGE UP (ref 2–14)
NEUTROPHILS # BLD AUTO: 4.33 K/UL — SIGNIFICANT CHANGE UP (ref 1.8–7.4)
NEUTROPHILS NFR BLD AUTO: 34.2 % — LOW (ref 43–77)
OVALOCYTES BLD QL SMEAR: SLIGHT — SIGNIFICANT CHANGE UP
PLAT MORPH BLD: NORMAL — SIGNIFICANT CHANGE UP
PLATELET # BLD AUTO: 319 K/UL — SIGNIFICANT CHANGE UP (ref 150–400)
POIKILOCYTOSIS BLD QL AUTO: SLIGHT — SIGNIFICANT CHANGE UP
POTASSIUM SERPL-MCNC: 3 MMOL/L — LOW (ref 3.5–5.3)
POTASSIUM SERPL-SCNC: 3 MMOL/L — LOW (ref 3.5–5.3)
PROT SERPL-MCNC: 7.5 G/DL — SIGNIFICANT CHANGE UP (ref 6.6–8.7)
RBC # BLD: 5.18 M/UL — SIGNIFICANT CHANGE UP (ref 4.2–5.8)
RBC # FLD: 12.9 % — SIGNIFICANT CHANGE UP (ref 10.3–14.5)
RBC BLD AUTO: ABNORMAL
SMUDGE CELLS # BLD: PRESENT — SIGNIFICANT CHANGE UP
SODIUM SERPL-SCNC: 138 MMOL/L — SIGNIFICANT CHANGE UP (ref 135–145)
VARIANT LYMPHS # BLD: 14 % — HIGH (ref 0–6)
WBC # BLD: 12.67 K/UL — HIGH (ref 3.8–10.5)
WBC # FLD AUTO: 12.67 K/UL — HIGH (ref 3.8–10.5)

## 2021-04-05 PROCEDURE — 99285 EMERGENCY DEPT VISIT HI MDM: CPT | Mod: 25

## 2021-04-05 PROCEDURE — 93005 ELECTROCARDIOGRAM TRACING: CPT

## 2021-04-05 PROCEDURE — 80053 COMPREHEN METABOLIC PANEL: CPT

## 2021-04-05 PROCEDURE — 99284 EMERGENCY DEPT VISIT MOD MDM: CPT

## 2021-04-05 PROCEDURE — 85025 COMPLETE CBC W/AUTO DIFF WBC: CPT

## 2021-04-05 PROCEDURE — 93010 ELECTROCARDIOGRAM REPORT: CPT

## 2021-04-05 PROCEDURE — 99053 MED SERV 10PM-8AM 24 HR FAC: CPT

## 2021-04-05 PROCEDURE — 96360 HYDRATION IV INFUSION INIT: CPT

## 2021-04-05 PROCEDURE — 80307 DRUG TEST PRSMV CHEM ANLYZR: CPT

## 2021-04-05 PROCEDURE — 36415 COLL VENOUS BLD VENIPUNCTURE: CPT

## 2021-04-05 RX ORDER — POTASSIUM CHLORIDE 20 MEQ
40 PACKET (EA) ORAL ONCE
Refills: 0 | Status: COMPLETED | OUTPATIENT
Start: 2021-04-05 | End: 2021-04-05

## 2021-04-05 RX ORDER — SODIUM CHLORIDE 9 MG/ML
1000 INJECTION, SOLUTION INTRAVENOUS ONCE
Refills: 0 | Status: COMPLETED | OUTPATIENT
Start: 2021-04-05 | End: 2021-04-05

## 2021-04-05 RX ORDER — SODIUM CHLORIDE 9 MG/ML
3 INJECTION INTRAMUSCULAR; INTRAVENOUS; SUBCUTANEOUS ONCE
Refills: 0 | Status: COMPLETED | OUTPATIENT
Start: 2021-04-05 | End: 2021-04-05

## 2021-04-05 RX ADMIN — Medication 40 MILLIEQUIVALENT(S): at 03:00

## 2021-04-05 RX ADMIN — SODIUM CHLORIDE 3 MILLILITER(S): 9 INJECTION INTRAMUSCULAR; INTRAVENOUS; SUBCUTANEOUS at 00:53

## 2021-04-05 RX ADMIN — SODIUM CHLORIDE 1000 MILLILITER(S): 9 INJECTION, SOLUTION INTRAVENOUS at 03:10

## 2021-04-05 RX ADMIN — Medication 40 MILLIEQUIVALENT(S): at 07:07

## 2021-04-05 RX ADMIN — SODIUM CHLORIDE 1000 MILLILITER(S): 9 INJECTION, SOLUTION INTRAVENOUS at 01:53

## 2021-04-05 RX ADMIN — SODIUM CHLORIDE 1000 MILLILITER(S): 9 INJECTION, SOLUTION INTRAVENOUS at 00:53

## 2021-04-05 NOTE — ED PROVIDER NOTE - PROGRESS NOTE DETAILS
Pt observed in ED.  Awake and alert at this time.  Ambulatory in ED with steady gait and denies any c/o.  Pt admits to hx of A-flutter.  Will give po K+ and repeat EKG.  Pt stable for d/c with Cardiology f/u as outpt

## 2021-04-05 NOTE — ED PROVIDER NOTE - PATIENT PORTAL LINK FT
You can access the FollowMyHealth Patient Portal offered by [facility] by registering at the following website: [email or web address]. By joining [facility]’s FollowMyHealth portal, you will also be able to view your health information using other applications (apps) compatible with our system.

## 2021-04-05 NOTE — ED ADULT TRIAGE NOTE - CHIEF COMPLAINT QUOTE
patient brought in by wife states that he had an "edible" for his birthday, shaky chest pain unable to answer questions, has HX of seizures not opening eyes not responding to name, brought to CC for eval

## 2021-04-05 NOTE — ED PROVIDER NOTE - NSFOLLOWUPINSTRUCTIONS_ED_ALL_ED_FT
Avoid any drugs  Follow up with your Cardiologist-call to schedule appt  Return sooner for any problems    Substance Abuse    Chemical dependency is an addiction to drugs or alcohol. It is characterized by the repeated behavior of seeking out and using drugs and alcohol despite harmful consequences to the health and safety of oneself and others. Using drugs in a manner that brought you to an Emergency Room suggests you may have an drug abuse problem. Seek help at a drug addiction center.    SEEK IMMEDIATE MEDICAL CARE IF YOU HAVE ANY OF THE FOLLOWING SYMPTOMS: chest pain, shortness of breath, change in mental status, thoughts about hurting killing yourself, thoughts about hurting or killing somebody else, hallucinations, or worsening depression.

## 2021-04-05 NOTE — ED ADULT NURSE REASSESSMENT NOTE - NS ED NURSE REASSESS COMMENT FT1
Pt. sleeping but arousable to voice, comfortably in stretcher. Pt. respirations even and unlabored. Pt. in no apparent distress. Pt. still tachycardic. MD Metz made aware. Fluid hung. Will give PO potassium when patient is more awake.

## 2021-04-05 NOTE — ED PROVIDER NOTE - OBJECTIVE STATEMENT
37 y/o M pt with significant PMHx of atrial flutter, HTN, and hypothyroid presents to the ED accompanied by his significant other who is acting as historian. She states that pt took a marijuana edible about 1 hour ago, vomited, and became unconscious. States he is not responding to verbal stimuli. Denies fall and denies trauma.

## 2021-04-05 NOTE — ED ADULT NURSE REASSESSMENT NOTE - NS ED NURSE REASSESS COMMENT FT1
Assumed pt. care at 010 from off going RN. Pt. received in yellow gown. Pt. sleeping comfortably in stretcher. Responsive to voice. Pt. on cardiac monitor and continuos pulse ox. Pt. in Afib in lead II. Pt. respirations even and unlabored. As per triage note pt. ate ediable tonight.  Pt. in no apparent distress. Pt. denies any complaints, just states he is tired.

## 2022-09-27 NOTE — ED ADULT NURSE NOTE - CHPI ED NUR DURATION
· UA with innumerable bacteria and leuks  Urine culture-E coli  Day 3 of Ceftriaxone    Plan-  Continue Ceftriaxone today

## 2023-01-28 NOTE — PROGRESS NOTE ADULT - ASSESSMENT
Impression:  Involuntary movements/tremors without clear seizure seminology per description but to further assess for any occult seizure and new onset atrial flutter.    Plan:    Await for EEG as ordered.  MRI Brain with and without damon seizure protocol.  DVT prophylaxis recommended.  Maintain seizure precautions  Follow per medicine/surgery and await for CCY.  D/w pt, wife and medical team in great detail.  Will follow. Impression:  Involuntary movements/tremors without clear seizure seminology per description but to further assess for any occult seizure and new onset atrial flutter.    Plan:    Await for EEG as ordered.  MRI Brain with and without damon seizure protocol.  DVT prophylaxis recommended.  Maintain seizure precautions  Follow per medicine/surgery and await for CCY.  D/w pt, wife and medical team in great detail.  Will follow.    Addendum:    MRI brain si unremarkable.  If EEG is unremarkable, then neuro stable to undergo CCY as per surgery/anesthesia.  D/w with medical team already.  Will follow. Private Auto Walk in

## 2023-08-10 NOTE — PATIENT PROFILE ADULT - NSTRANSFERBELONGINGSDISPO_GEN_A_NUR
[de-identified] : DEBBIE OSEGUERA is a 85 year old male being seen for right shoulder pain. He presents today with an MRI of his right shoulder from [facility]. not applicable

## 2023-11-20 NOTE — ED ADULT NURSE REASSESSMENT NOTE - NEURO WDL
Normal Eye examination including dilation and refraction. Normal Optic Nerve both eyes and normal Retina both eyes. Visual Acuity best corrected 20/20 in each eye with minimal refractive error. No glasses needed. Discussed functional vision problem with parents and patient. Vision will be blurred at near from dilating drops which will wear off by tomorrow. Recommend return to ED now. Exam not completed. Then follow up with PCP  at [place] next week or sooner. Alert and oriented to person, place and time, memory intact, behavior appropriate to situation, PERRL.

## 2024-09-11 NOTE — ED PROVIDER NOTE - TIMING
sudden onset